# Patient Record
Sex: MALE | Race: WHITE | Employment: OTHER | URBAN - METROPOLITAN AREA
[De-identification: names, ages, dates, MRNs, and addresses within clinical notes are randomized per-mention and may not be internally consistent; named-entity substitution may affect disease eponyms.]

---

## 2017-01-05 ENCOUNTER — OFFICE VISIT (OUTPATIENT)
Dept: INTERNAL MEDICINE CLINIC | Age: 72
End: 2017-01-05

## 2017-01-05 VITALS
OXYGEN SATURATION: 98 % | HEART RATE: 71 BPM | HEIGHT: 74 IN | DIASTOLIC BLOOD PRESSURE: 50 MMHG | TEMPERATURE: 98.3 F | RESPIRATION RATE: 18 BRPM | SYSTOLIC BLOOD PRESSURE: 120 MMHG | WEIGHT: 236 LBS | BODY MASS INDEX: 30.29 KG/M2

## 2017-01-05 DIAGNOSIS — J06.9 ACUTE URI: Primary | ICD-10-CM

## 2017-01-05 RX ORDER — CODEINE PHOSPHATE AND GUAIFENESIN 10; 100 MG/5ML; MG/5ML
5 SOLUTION ORAL
Qty: 120 ML | Refills: 0 | Status: SHIPPED | OUTPATIENT
Start: 2017-01-05 | End: 2017-03-29 | Stop reason: ALTCHOICE

## 2017-01-05 RX ORDER — FLUTICASONE PROPIONATE 50 MCG
SPRAY, SUSPENSION (ML) NASAL
Qty: 1 BOTTLE | Refills: 1 | Status: SHIPPED | OUTPATIENT
Start: 2017-01-05 | End: 2017-09-08 | Stop reason: ALTCHOICE

## 2017-01-05 RX ORDER — AZITHROMYCIN 250 MG/1
TABLET, FILM COATED ORAL
Qty: 6 TAB | Refills: 0 | Status: SHIPPED | OUTPATIENT
Start: 2017-01-05 | End: 2017-03-29 | Stop reason: ALTCHOICE
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
## 2017-01-05 NOTE — PROGRESS NOTES
Patient presents for   Chief Complaint   Patient presents with    Nasal Congestion     since 12/15/2016     Fall risk assessment was not indicated. Depression screening was not indicated Follow up questions were not indicated. 1. Have you been to the ER, urgent care clinic since your last visit? Hospitalized since your last visit? No    2. Have you seen or consulted any other health care providers outside of the [facility] [place] since your last visit? Include any pap smears or colon screening.  No

## 2017-02-03 RX ORDER — SITAGLIPTIN 100 MG/1
TABLET, FILM COATED ORAL
Qty: 90 TAB | Refills: 4 | Status: SHIPPED | OUTPATIENT
Start: 2017-02-03 | End: 2018-03-01 | Stop reason: SDUPTHER

## 2017-02-03 RX ORDER — SERTRALINE HYDROCHLORIDE 100 MG/1
TABLET, FILM COATED ORAL
Qty: 90 TAB | Refills: 4 | Status: SHIPPED | OUTPATIENT
Start: 2017-02-03 | End: 2018-03-11 | Stop reason: SDUPTHER

## 2017-03-01 RX ORDER — ATORVASTATIN CALCIUM 10 MG/1
TABLET, FILM COATED ORAL
Qty: 45 TAB | Refills: 0 | Status: SHIPPED | OUTPATIENT
Start: 2017-03-01 | End: 2017-05-28 | Stop reason: SDUPTHER

## 2017-03-26 RX ORDER — PIOGLITAZONE HCL AND METFORMIN HCL 850; 15 MG/1; MG/1
TABLET ORAL
Qty: 180 TAB | Refills: 0 | Status: SHIPPED | OUTPATIENT
Start: 2017-03-26 | End: 2017-06-22 | Stop reason: SDUPTHER

## 2017-03-27 ENCOUNTER — LAB ONLY (OUTPATIENT)
Dept: INTERNAL MEDICINE CLINIC | Age: 72
End: 2017-03-27

## 2017-03-27 ENCOUNTER — HOSPITAL ENCOUNTER (OUTPATIENT)
Dept: LAB | Age: 72
Discharge: HOME | End: 2017-03-27
Payer: MEDICARE

## 2017-03-27 DIAGNOSIS — E78.2 MIXED HYPERLIPIDEMIA: ICD-10-CM

## 2017-03-27 DIAGNOSIS — I10 ESSENTIAL HYPERTENSION, BENIGN: ICD-10-CM

## 2017-03-27 DIAGNOSIS — E11.9 CONTROLLED TYPE 2 DIABETES MELLITUS WITHOUT COMPLICATION, WITHOUT LONG-TERM CURRENT USE OF INSULIN (HCC): Primary | ICD-10-CM

## 2017-03-27 DIAGNOSIS — E11.9 CONTROLLED TYPE 2 DIABETES MELLITUS WITHOUT COMPLICATION, WITHOUT LONG-TERM CURRENT USE OF INSULIN (HCC): ICD-10-CM

## 2017-03-27 LAB
ALBUMIN SERPL BCP-MCNC: 3.7 G/DL (ref 3.4–5)
ALBUMIN/GLOB SERPL: 1.2 {RATIO} (ref 0.8–1.7)
ALP SERPL-CCNC: 72 U/L (ref 45–117)
ALT SERPL-CCNC: 25 U/L (ref 16–61)
ANION GAP BLD CALC-SCNC: 6 MMOL/L (ref 3–18)
APPEARANCE UR: CLEAR
AST SERPL W P-5'-P-CCNC: 19 U/L (ref 15–37)
BACTERIA URNS QL MICRO: NEGATIVE /HPF
BILIRUB SERPL-MCNC: 0.4 MG/DL (ref 0.2–1)
BILIRUB UR QL: NEGATIVE
BUN SERPL-MCNC: 17 MG/DL (ref 7–18)
BUN/CREAT SERPL: 19 (ref 12–20)
CALCIUM SERPL-MCNC: 9 MG/DL (ref 8.5–10.1)
CHLORIDE SERPL-SCNC: 104 MMOL/L (ref 100–108)
CO2 SERPL-SCNC: 28 MMOL/L (ref 21–32)
COLOR UR: YELLOW
CREAT SERPL-MCNC: 0.89 MG/DL (ref 0.6–1.3)
EPITH CASTS URNS QL MICRO: NORMAL /LPF (ref 0–5)
GLOBULIN SER CALC-MCNC: 3 G/DL (ref 2–4)
GLUCOSE SERPL-MCNC: 154 MG/DL (ref 74–99)
GLUCOSE UR STRIP.AUTO-MCNC: NEGATIVE MG/DL
HGB UR QL STRIP: NEGATIVE
KETONES UR QL STRIP.AUTO: NEGATIVE MG/DL
LEUKOCYTE ESTERASE UR QL STRIP.AUTO: NEGATIVE
NITRITE UR QL STRIP.AUTO: NEGATIVE
PH UR STRIP: 5.5 [PH] (ref 5–8)
POTASSIUM SERPL-SCNC: 4.6 MMOL/L (ref 3.5–5.5)
PROT SERPL-MCNC: 6.7 G/DL (ref 6.4–8.2)
PROT UR STRIP-MCNC: NEGATIVE MG/DL
RBC #/AREA URNS HPF: 0 /HPF (ref 0–5)
SODIUM SERPL-SCNC: 138 MMOL/L (ref 136–145)
SP GR UR REFRACTOMETRY: 1.02 (ref 1–1.03)
UROBILINOGEN UR QL STRIP.AUTO: 0.2 EU/DL (ref 0.2–1)
WBC URNS QL MICRO: NORMAL /HPF (ref 0–4)

## 2017-03-27 PROCEDURE — 80053 COMPREHEN METABOLIC PANEL: CPT | Performed by: INTERNAL MEDICINE

## 2017-03-27 PROCEDURE — 81001 URINALYSIS AUTO W/SCOPE: CPT | Performed by: INTERNAL MEDICINE

## 2017-03-29 ENCOUNTER — HOSPITAL ENCOUNTER (OUTPATIENT)
Dept: GENERAL RADIOLOGY | Age: 72
Discharge: HOME | End: 2017-03-29
Payer: MEDICARE

## 2017-03-29 ENCOUNTER — OFFICE VISIT (OUTPATIENT)
Dept: INTERNAL MEDICINE CLINIC | Age: 72
End: 2017-03-29

## 2017-03-29 VITALS
RESPIRATION RATE: 16 BRPM | WEIGHT: 242 LBS | TEMPERATURE: 97.3 F | HEIGHT: 74 IN | BODY MASS INDEX: 31.06 KG/M2 | DIASTOLIC BLOOD PRESSURE: 60 MMHG | HEART RATE: 68 BPM | SYSTOLIC BLOOD PRESSURE: 124 MMHG | OXYGEN SATURATION: 97 %

## 2017-03-29 DIAGNOSIS — E78.2 MIXED HYPERLIPIDEMIA: ICD-10-CM

## 2017-03-29 DIAGNOSIS — I10 ESSENTIAL HYPERTENSION, BENIGN: ICD-10-CM

## 2017-03-29 DIAGNOSIS — E11.9 CONTROLLED TYPE 2 DIABETES MELLITUS WITHOUT COMPLICATION, WITHOUT LONG-TERM CURRENT USE OF INSULIN (HCC): Primary | ICD-10-CM

## 2017-03-29 PROCEDURE — 71020 XR CHEST PA LAT: CPT

## 2017-03-29 NOTE — MR AVS SNAPSHOT
Visit Information Date & Time Provider Department Dept. Phone Encounter #  
 3/29/2017 11:15 AM Dana Vasquez MD [facility] [place] [phone] [id number] Your Appointments 9/21/2017 12:00 PM  
Follow Up with Dana Vasquez MD  
[address] [facility]) Appt Note: 6mo  
 [address], [address] [place] [address]  
  
   
 [address], [address] [place] [address] Upcoming Health Maintenance Date Due Hepatitis C Screening 1945 DTaP/Tdap/Td series (1 - Tdap) 7/9/1967 EYE EXAM RETINAL OR DILATED Q1 1/29/2016 GLAUCOMA SCREENING Q2Y 1/29/2017 HEMOGLOBIN A1C Q6M 4/20/2017 MICROALBUMIN Q1 10/20/2017 LIPID PANEL Q1 10/20/2017 MEDICARE YEARLY EXAM 10/21/2017 FOOT EXAM Q1 10/23/2017 COLONOSCOPY 11/10/2020 Allergies as of 3/29/2017  Review Complete On: 3/29/2017 By: Alfonzo Gil LPN No Known Allergies Current Immunizations  Reviewed on 10/20/2016 Name Date Influenza High Dose Vaccine PF 10/20/2016 Influenza Vaccine Stephanie Khurram) 10/5/2015 Pneumococcal Conjugate (PCV-13) 10/5/2015 Pneumococcal Polysaccharide (PPSV-23) 6/14/2012, 6/8/2009 Not reviewed this visit You Were Diagnosed With   
  
 Codes Comments Essential hypertension, benign    -  Primary ICD-10-CM: I10 
ICD-9-CM: 401.1 Vitals BP Pulse Temp Resp Height(growth percentile) 124/60 (BP 1 Location: Left arm, BP Patient Position: Sitting) 68 97.3 °F (36.3 °C) (Tympanic) 16 6' 1.5\" (1.867 m) Weight(growth percentile) SpO2 BMI Smoking Status 242 lb (109.8 kg) 97% 31.5 kg/m2 Never Smoker Vitals History BMI and BSA Data Body Mass Index Body Surface Area 31.5 kg/m 2 2.39 m 2 Preferred Pharmacy Pharmacy Name Phone [id number] [address] [phone] Your Updated Medication List  
  
 This list is accurate as of: 3/29/17  1:21 PM.  Always use your most recent med list.  
  
  
  
  
 atorvastatin 10 mg tablet Commonly known as:  LIPITOR  
TAKE 1/2 TABLET BY MOUTH EVERY DAY  
  
 fluticasone 50 mcg/actuation nasal spray Commonly known as:  FLONASE  
2 spray each nostril daily  
  
 gabapentin 100 mg capsule Commonly known as:  NEURONTIN  
  
 glimepiride 4 mg tablet Commonly known as:  AMARYL  
TAKE 1 AND 1/2 TABLET BY MOUTH IN THE MORNING ABD 1/2 TABLET EVERY MORNING  
  
 JANUVIA 100 mg tablet Generic drug:  SITagliptin TAKE 1 TABLET BY MOUTH DAILY pioglitazone-metFORMIN  mg per tablet Commonly known as:  ACTOPLUS MET  
TAKE 1 TABLET BY MOUTH TWICE A DAY  
  
 ramipril 10 mg capsule Commonly known as:  ALTACE  
TAKE 1 CAPSULE BY MOUTH DAILY. sertraline 100 mg tablet Commonly known as:  ZOLOFT  
TAKE 1 TABLET BY MOUTH DAILY  
  
 tadalafil 5 mg tablet Commonly known as:  CIALIS  
1 tablet daily WELCHOL 625 mg tablet Generic drug:  colesevelam  
TAKE 3 TABLETS BY MOUTH TWICE A DAY WITH MEALS To-Do List   
 03/29/2017 Imaging:  XR CHEST PA LAT Patient Instructions Health Maintenance Due Topic Date Due  
[place] Dose Test  1945  
 DTaP/Tdap/Td  (1 - Tdap) 07/09/1967  Eye Exam  01/29/2016  Glaucoma Screening   01/29/2017  Hemoglobin A1C    04/20/2017 Introducing [facility]! Ledy Venegas introduces [employer]ots patient portal. Now you can access parts of your medical record, email your doctor's office, and request medication refills online. 1. In your internet browser, go to [email or web address] 2. Click on the First Time User? Click Here link in the Sign In box. You will see the New Member Sign Up page. 3. Enter your Y[employer]ots Access Code exactly as it appears below. You will not need to use this code after youve completed the sign-up process.  If you do not sign up before the expiration date, you must request a new code. · Panjo Access Code: [email or web address] Expires: 6/27/2017  1:21 PM 
 
4. Enter the last four digits of your Social Security Number (xxxx) and Date of Birth (mm/dd/yyyy) as indicated and click Submit. You will be taken to the next sign-up page. 5. Create a Panjo ID. This will be your Panjo login ID and cannot be changed, so think of one that is secure and easy to remember. 6. Create a Panjo password. You can change your password at any time. 7. Enter your Password Reset Question and Answer. This can be used at a later time if you forget your password. 8. Enter your e-mail address. You will receive e-mail notification when new information is available in [address] 9. Click Sign Up. You can now view and download portions of your medical record. 10. Click the Download Summary menu link to download a portable copy of your medical information. If you have questions, please visit the Frequently Asked Questions section of the Panjo website. Remember, Panjo is NOT to be used for urgent needs. For medical emergencies, dial 911. Now available from your iPhone and Android! Please provide this summary of care documentation to your next provider. Your primary care clinician is listed as Xiao Mckay. If you have any questions after today's visit, please call [phone].

## 2017-03-29 NOTE — PATIENT INSTRUCTIONS
Health Maintenance Due   Topic Date Due    Hepatitis C Test  1945    DTaP/Tdap/Td  (1 - Tdap) 07/09/1967    Eye Exam  01/29/2016    Glaucoma Screening   01/29/2017    Hemoglobin A1C    04/20/2017

## 2017-03-29 NOTE — PROGRESS NOTES
1. Have you been to the ER, urgent care clinic since your last visit? Hospitalized since your last visit? No    2. Have you seen or consulted any other health care providers outside of the [address] [place] [address] since your last visit? Include any pap smears or colon screening.  No
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
## 2017-04-03 ENCOUNTER — HOSPITAL ENCOUNTER (OUTPATIENT)
Dept: CT IMAGING | Age: 72
Discharge: HOME | End: 2017-04-03
Attending: INTERNAL MEDICINE
Payer: MEDICARE

## 2017-04-03 DIAGNOSIS — R93.89 ABNORMAL CHEST X-RAY: Primary | ICD-10-CM

## 2017-04-03 DIAGNOSIS — R93.89 ABNORMAL CHEST X-RAY: ICD-10-CM

## 2017-04-03 PROCEDURE — 71260 CT THORAX DX C+: CPT

## 2017-04-03 PROCEDURE — 74011636320 HC RX REV CODE- 636/320: Performed by: INTERNAL MEDICINE

## 2017-04-03 RX ADMIN — IOPAMIDOL 80 ML: 612 INJECTION, SOLUTION INTRAVENOUS at 13:00

## 2017-04-05 ENCOUNTER — TELEPHONE (OUTPATIENT)
Dept: INTERNAL MEDICINE CLINIC | Age: 72
End: 2017-04-05

## 2017-04-05 NOTE — TELEPHONE ENCOUNTER
Patient returning someone from heres call #[phone] He stated it could be about a test he had yesterday.

## 2017-04-05 NOTE — TELEPHONE ENCOUNTER
Called and informed patient of normal ct results. Added that Dr Sameera Smith will be having another MD look at the scans to make sure they are clear and if anything changes patient will be informed. Understanding was voiced.

## 2017-05-04 RX ORDER — RAMIPRIL 10 MG/1
CAPSULE ORAL
Qty: 90 CAP | Refills: 1 | Status: SHIPPED | OUTPATIENT
Start: 2017-05-04 | End: 2017-11-01 | Stop reason: SDUPTHER

## 2017-05-28 RX ORDER — COLESEVELAM HYDROCHLORIDE 625 MG/1
TABLET, FILM COATED ORAL
Qty: 540 TAB | Refills: 1 | Status: SHIPPED | OUTPATIENT
Start: 2017-05-28 | End: 2017-12-14 | Stop reason: SDUPTHER

## 2017-05-28 RX ORDER — ATORVASTATIN CALCIUM 10 MG/1
TABLET, FILM COATED ORAL
Qty: 45 TAB | Refills: 0 | Status: SHIPPED | OUTPATIENT
Start: 2017-05-28 | End: 2017-09-02 | Stop reason: SDUPTHER

## 2017-06-22 RX ORDER — PIOGLITAZONE HCL AND METFORMIN HCL 850; 15 MG/1; MG/1
TABLET ORAL
Qty: 180 TAB | Refills: 0 | Status: SHIPPED | OUTPATIENT
Start: 2017-06-22 | End: 2017-09-29 | Stop reason: SDUPTHER

## 2017-08-06 RX ORDER — GABAPENTIN 100 MG/1
CAPSULE ORAL
Qty: 360 CAP | Refills: 3 | Status: SHIPPED | OUTPATIENT
Start: 2017-08-06 | End: 2018-10-03 | Stop reason: SDUPTHER

## 2017-08-16 ENCOUNTER — TELEPHONE (OUTPATIENT)
Dept: INTERNAL MEDICINE CLINIC | Age: 72
End: 2017-08-16

## 2017-08-16 DIAGNOSIS — R20.2 PARESTHESIA: Primary | ICD-10-CM

## 2017-08-16 NOTE — TELEPHONE ENCOUNTER
Patient's daughter is requesting referral to Dr. Odilia Dyer for tingling on left side. He had stroke about 5 years ago. We will schedule the appointment for patient. no

## 2017-08-29 ENCOUNTER — OFFICE VISIT (OUTPATIENT)
Dept: NEUROLOGY | Age: 72
End: 2017-08-29

## 2017-08-29 VITALS
TEMPERATURE: 97.4 F | HEIGHT: 74 IN | SYSTOLIC BLOOD PRESSURE: 138 MMHG | HEART RATE: 63 BPM | BODY MASS INDEX: 30.16 KG/M2 | WEIGHT: 235 LBS | DIASTOLIC BLOOD PRESSURE: 62 MMHG | RESPIRATION RATE: 12 BRPM | OXYGEN SATURATION: 95 %

## 2017-08-29 DIAGNOSIS — E11.9 CONTROLLED TYPE 2 DIABETES MELLITUS WITHOUT COMPLICATION, WITH LONG-TERM CURRENT USE OF INSULIN (HCC): ICD-10-CM

## 2017-08-29 DIAGNOSIS — Z79.4 CONTROLLED TYPE 2 DIABETES MELLITUS WITHOUT COMPLICATION, WITH LONG-TERM CURRENT USE OF INSULIN (HCC): ICD-10-CM

## 2017-08-29 DIAGNOSIS — I61.0 NONTRAUMATIC SUBCORTICAL HEMORRHAGE OF RIGHT CEREBRAL HEMISPHERE (HCC): Primary | ICD-10-CM
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
## 2017-08-29 NOTE — MR AVS SNAPSHOT
Visit Information Date & Time Provider Department Dept. Phone Encounter #  
 8/29/2017  8:00 AM Magdy Tapia MD [address] at [address] [facility] [id number] Follow-up Instructions Return in about 4 weeks (around 9/26/2017). Follow-up and Disposition History Your Appointments 9/21/2017 12:00 PM  
Follow Up with Brennen Mark MD  
55 [facility]) Appt Note: 6mo  
 [address]  35 [facility]  
[phone]  
  
   
 [address] [facility] [address]  
  
    
 10/3/2017  3:40 PM  
Follow Up with Magdy Tapia MD  
[address] at [address] [facility]) Appt Note: 4 week fu  MRI  
 [address] [address] [address] [facility] 630 [facility] [address] 200 [facility] Upcoming Health Maintenance Date Due Hepatitis C Screening 1945 DTaP/Tdap/Td series (1 - Tdap) 7/9/1967 HEMOGLOBIN A1C Q6M 4/20/2017 INFLUENZA AGE 9 TO ADULT 8/1/2017 MICROALBUMIN Q1 10/20/2017 LIPID PANEL Q1 10/20/2017 MEDICARE YEARLY EXAM 10/21/2017 EYE EXAM RETINAL OR DILATED Q1 3/30/2018 FOOT EXAM Q1 7/24/2018 GLAUCOMA SCREENING Q2Y 3/30/2019 COLONOSCOPY 11/10/2020 Allergies as of 8/29/2017  Review Complete On: 8/29/2017 By: Sen Blue LPN No Known Allergies Current Immunizations  Reviewed on 10/20/2016 Name Date Influenza High Dose Vaccine PF 10/20/2016 Influenza Vaccine Remus Ligas) 10/5/2015 Pneumococcal Conjugate (PCV-13) 10/5/2015 Pneumococcal Polysaccharide (PPSV-23) 6/14/2012, 6/8/2009 Not reviewed this visit You Were Diagnosed With   
  
 Codes Comments Nontraumatic subcortical hemorrhage of right cerebral hemisphere [facility])    -  Primary ICD-10-CM: I61.0 ICD-9-CM: 752  Controlled type 2 diabetes mellitus without complication, with long-term current use of insulin (HCC)     ICD-10-CM: E11.9, Z79.4 ICD-9-CM: 250.00, V58.67 Vitals BP Pulse Temp Resp Height(growth percentile) Weight(growth percentile)  
 138/62 (BP 1 Location: Left arm, BP Patient Position: Sitting) 63 97.4 °F (36.3 °C) (Oral) 12 6' 1.5\" (1.867 m) 235 lb (106.6 kg) SpO2 BMI Smoking Status 95% 30.58 kg/m2 Never Smoker Vitals History BMI and BSA Data Body Mass Index Body Surface Area 30.58 kg/m 2 2.35 m 2 Preferred Pharmacy Pharmacy Name Phone [id number]- [address] [phone] Your Updated Medication List  
  
   
This list is accurate as of: 8/29/17  9:11 AM.  Always use your most recent med list.  
  
  
  
  
 atorvastatin 10 mg tablet Commonly known as:  LIPITOR  
TAKE 1/2 TABLET BY MOUTH EVERY DAY  
  
 fluticasone 50 mcg/actuation nasal spray Commonly known as:  FLONASE  
2 spray each nostril daily  
  
 gabapentin 100 mg capsule Commonly known as:  NEURONTIN  
TAKE 1 CAPSULE BY MOUTH IN THE AM, 1 CAPSULE IN THE MID DAY, 2 CAPSULES AT BEDTIME  
  
 glimepiride 4 mg tablet Commonly known as:  AMARYL  
TAKE 1 AND 1/2 TABLET BY MOUTH IN THE MORNING ABD 1/2 TABLET EVERY MORNING  
  
 JANUVIA 100 mg tablet Generic drug:  SITagliptin TAKE 1 TABLET BY MOUTH DAILY pioglitazone-metFORMIN  mg per tablet Commonly known as:  ACTOPLUS MET  
TAKE 1 TABLET BY MOUTH TWICE A DAY  
  
 ramipril 10 mg capsule Commonly known as:  ALTACE  
TAKE 1 CAPSULE BY MOUTH DAILY. sertraline 100 mg tablet Commonly known as:  ZOLOFT  
TAKE 1 TABLET BY MOUTH DAILY WELCHOL 625 mg tablet Generic drug:  colesevelam  
TAKE 3 TABLETS BY MOUTH TWICE A DAY WITH MEALS Follow-up Instructions Return in about 4 weeks (around 9/26/2017). To-Do List   
 08/29/2017 Imaging:  MRI BRAIN WO CONT Introducing [facility]! aJckson Portillo introduces [employer] patient portal. Now you can access parts of your medical record, email your doctor's office, and request medication refills online. 1. In your internet browser, go to [email or web address] 2. Click on the First Time User? Click Here link in the Sign In box. You will see the New Member Sign Up page. 3. Enter your [employer] Access Code exactly as it appears below. You will not need to use this code after youve completed the sign-up process. If you do not sign up before the expiration date, you must request a new code. · [employer] Access Code: [email or web address] Expires: 11/27/2017  7:44 AM 
 
4. Enter the last four digits of your Social Security Number (xxxx) and Date of Birth (mm/dd/yyyy) as indicated and click Submit. You will be taken to the next sign-up page. 5. Create a [employer] ID. This will be your [employer] login ID and cannot be changed, so think of one that is secure and easy to remember. 6. Create a [employer] password. You can change your password at any time. 7. Enter your Password Reset Question and Answer. This can be used at a later time if you forget your password. 8. Enter your e-mail address. You will receive e-mail notification when new information is available in [address] 9. Click Sign Up. You can now view and download portions of your medical record. 10. Click the Download Summary menu link to download a portable copy of your medical information. If you have questions, please visit the Frequently Asked Questions section of the [employer] website. Remember, [employer] is NOT to be used for urgent needs. For medical emergencies, dial 911. Now available from your iPhone and Android! Please provide this summary of care documentation to your next provider. Your primary care clinician is listed as Julianne Sykes. If you have any questions after today's visit, please call [phone].
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
## 2017-09-02 RX ORDER — ATORVASTATIN CALCIUM 10 MG/1
TABLET, FILM COATED ORAL
Qty: 45 TAB | Refills: 0 | Status: SHIPPED | OUTPATIENT
Start: 2017-09-02 | End: 2017-12-01 | Stop reason: SDUPTHER

## 2017-09-05 ENCOUNTER — HOSPITAL ENCOUNTER (OUTPATIENT)
Age: 72
Discharge: HOME | End: 2017-09-05
Attending: PSYCHIATRY & NEUROLOGY
Payer: MEDICARE

## 2017-09-05 ENCOUNTER — OFFICE VISIT (OUTPATIENT)
Dept: INTERNAL MEDICINE CLINIC | Age: 72
End: 2017-09-05

## 2017-09-05 VITALS
SYSTOLIC BLOOD PRESSURE: 120 MMHG | DIASTOLIC BLOOD PRESSURE: 60 MMHG | TEMPERATURE: 97.4 F | OXYGEN SATURATION: 99 % | RESPIRATION RATE: 16 BRPM | HEART RATE: 64 BPM | WEIGHT: 235 LBS | HEIGHT: 74 IN | BODY MASS INDEX: 30.16 KG/M2

## 2017-09-05 DIAGNOSIS — I61.0 NONTRAUMATIC SUBCORTICAL HEMORRHAGE OF RIGHT CEREBRAL HEMISPHERE (HCC): ICD-10-CM

## 2017-09-05 DIAGNOSIS — Z23 ENCOUNTER FOR IMMUNIZATION: ICD-10-CM

## 2017-09-05 LAB — GLUCOSE POC: 181 MG/DL

## 2017-09-05 PROCEDURE — 70551 MRI BRAIN STEM W/O DYE: CPT

## 2017-09-05 NOTE — PROGRESS NOTES
1. Have you been to the ER, urgent care clinic since your last visit? Hospitalized since your last visit? No    2. Have you seen or consulted any other health care providers outside of the [address] [place] [address] since your last visit? Include any pap smears or colon screening.  No
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
## 2017-09-21 ENCOUNTER — LAB ONLY (OUTPATIENT)
Dept: INTERNAL MEDICINE CLINIC | Age: 72
End: 2017-09-21

## 2017-09-21 ENCOUNTER — OFFICE VISIT (OUTPATIENT)
Dept: INTERNAL MEDICINE CLINIC | Age: 72
End: 2017-09-21

## 2017-09-21 ENCOUNTER — HOSPITAL ENCOUNTER (OUTPATIENT)
Dept: LAB | Age: 72
Discharge: HOME | End: 2017-09-21
Payer: MEDICARE

## 2017-09-21 VITALS
HEIGHT: 74 IN | BODY MASS INDEX: 29.26 KG/M2 | SYSTOLIC BLOOD PRESSURE: 118 MMHG | TEMPERATURE: 97.7 F | OXYGEN SATURATION: 98 % | WEIGHT: 228 LBS | RESPIRATION RATE: 16 BRPM | DIASTOLIC BLOOD PRESSURE: 58 MMHG | HEART RATE: 62 BPM

## 2017-09-21 DIAGNOSIS — E78.2 MIXED HYPERLIPIDEMIA: ICD-10-CM

## 2017-09-21 DIAGNOSIS — I10 ESSENTIAL HYPERTENSION, BENIGN: ICD-10-CM

## 2017-09-21 DIAGNOSIS — E11.9 CONTROLLED TYPE 2 DIABETES MELLITUS WITHOUT COMPLICATION, WITH LONG-TERM CURRENT USE OF INSULIN (HCC): Primary | ICD-10-CM

## 2017-09-21 DIAGNOSIS — Z79.4 CONTROLLED TYPE 2 DIABETES MELLITUS WITHOUT COMPLICATION, WITH LONG-TERM CURRENT USE OF INSULIN (HCC): Primary | ICD-10-CM

## 2017-09-21 DIAGNOSIS — Z79.4 CONTROLLED TYPE 2 DIABETES MELLITUS WITHOUT COMPLICATION, WITH LONG-TERM CURRENT USE OF INSULIN (HCC): ICD-10-CM

## 2017-09-21 DIAGNOSIS — E11.9 CONTROLLED TYPE 2 DIABETES MELLITUS WITHOUT COMPLICATION, WITH LONG-TERM CURRENT USE OF INSULIN (HCC): ICD-10-CM

## 2017-09-21 LAB
ALBUMIN SERPL-MCNC: 3.9 G/DL (ref 3.4–5)
ALBUMIN/GLOB SERPL: 1.3 {RATIO} (ref 0.8–1.7)
ALP SERPL-CCNC: 59 U/L (ref 45–117)
ALT SERPL-CCNC: 31 U/L (ref 16–61)
ANION GAP SERPL CALC-SCNC: 7 MMOL/L (ref 3–18)
AST SERPL-CCNC: 26 U/L (ref 15–37)
BASOPHILS # BLD: 0 K/UL (ref 0–0.06)
BASOPHILS NFR BLD: 1 % (ref 0–2)
BILIRUB SERPL-MCNC: 0.5 MG/DL (ref 0.2–1)
BUN SERPL-MCNC: 17 MG/DL (ref 7–18)
BUN/CREAT SERPL: 19 (ref 12–20)
CALCIUM SERPL-MCNC: 8.6 MG/DL (ref 8.5–10.1)
CHLORIDE SERPL-SCNC: 107 MMOL/L (ref 100–108)
CO2 SERPL-SCNC: 27 MMOL/L (ref 21–32)
CREAT SERPL-MCNC: 0.91 MG/DL (ref 0.6–1.3)
DIFFERENTIAL METHOD BLD: ABNORMAL
EOSINOPHIL # BLD: 0.2 K/UL (ref 0–0.4)
EOSINOPHIL NFR BLD: 2 % (ref 0–5)
ERYTHROCYTE [DISTWIDTH] IN BLOOD BY AUTOMATED COUNT: 14.5 % (ref 11.6–14.5)
EST. AVERAGE GLUCOSE BLD GHB EST-MCNC: 180 MG/DL
GLOBULIN SER CALC-MCNC: 3 G/DL (ref 2–4)
GLUCOSE POC: 283 MG/DL
GLUCOSE SERPL-MCNC: 90 MG/DL (ref 74–99)
HBA1C MFR BLD: 7.9 % (ref 4.2–5.6)
HCT VFR BLD AUTO: 41.2 % (ref 36–48)
HGB BLD-MCNC: 13 G/DL (ref 13–16)
LYMPHOCYTES # BLD: 1.8 K/UL (ref 0.9–3.6)
LYMPHOCYTES NFR BLD: 21 % (ref 21–52)
MCH RBC QN AUTO: 29.5 PG (ref 24–34)
MCHC RBC AUTO-ENTMCNC: 31.6 G/DL (ref 31–37)
MCV RBC AUTO: 93.6 FL (ref 74–97)
MONOCYTES # BLD: 0.7 K/UL (ref 0.05–1.2)
MONOCYTES NFR BLD: 8 % (ref 3–10)
NEUTS SEG # BLD: 5.7 K/UL (ref 1.8–8)
NEUTS SEG NFR BLD: 68 % (ref 40–73)
PLATELET # BLD AUTO: 221 K/UL (ref 135–420)
PMV BLD AUTO: 9.7 FL (ref 9.2–11.8)
POTASSIUM SERPL-SCNC: 4.5 MMOL/L (ref 3.5–5.5)
PROT SERPL-MCNC: 6.9 G/DL (ref 6.4–8.2)
RBC # BLD AUTO: 4.4 M/UL (ref 4.7–5.5)
SODIUM SERPL-SCNC: 141 MMOL/L (ref 136–145)
WBC # BLD AUTO: 8.3 K/UL (ref 4.6–13.2)

## 2017-09-21 PROCEDURE — 36415 COLL VENOUS BLD VENIPUNCTURE: CPT | Performed by: INTERNAL MEDICINE

## 2017-09-21 PROCEDURE — 80053 COMPREHEN METABOLIC PANEL: CPT | Performed by: INTERNAL MEDICINE

## 2017-09-21 PROCEDURE — 83036 HEMOGLOBIN GLYCOSYLATED A1C: CPT | Performed by: INTERNAL MEDICINE

## 2017-09-21 PROCEDURE — 85025 COMPLETE CBC W/AUTO DIFF WBC: CPT | Performed by: INTERNAL MEDICINE

## 2017-09-27 ENCOUNTER — HOSPITAL ENCOUNTER (OUTPATIENT)
Dept: LAB | Age: 72
Discharge: HOME | End: 2017-09-27
Payer: MEDICARE

## 2017-09-27 DIAGNOSIS — E11.9 CONTROLLED TYPE 2 DIABETES MELLITUS WITHOUT COMPLICATION, WITH LONG-TERM CURRENT USE OF INSULIN (HCC): ICD-10-CM

## 2017-09-27 DIAGNOSIS — I10 ESSENTIAL HYPERTENSION, BENIGN: ICD-10-CM

## 2017-09-27 DIAGNOSIS — E78.2 MIXED HYPERLIPIDEMIA: ICD-10-CM

## 2017-09-27 DIAGNOSIS — Z79.4 CONTROLLED TYPE 2 DIABETES MELLITUS WITHOUT COMPLICATION, WITH LONG-TERM CURRENT USE OF INSULIN (HCC): ICD-10-CM

## 2017-09-27 LAB
APPEARANCE UR: CLEAR
BACTERIA URNS QL MICRO: ABNORMAL /HPF
BILIRUB UR QL: NEGATIVE
COLOR UR: ABNORMAL
EPITH CASTS URNS QL MICRO: NEGATIVE /LPF (ref 0–5)
GLUCOSE UR STRIP.AUTO-MCNC: >1000 MG/DL
HGB UR QL STRIP: NEGATIVE
KETONES UR QL STRIP.AUTO: NEGATIVE MG/DL
LEUKOCYTE ESTERASE UR QL STRIP.AUTO: NEGATIVE
NITRITE UR QL STRIP.AUTO: NEGATIVE
PH UR STRIP: 5 [PH] (ref 5–8)
PROT UR STRIP-MCNC: NEGATIVE MG/DL
RBC #/AREA URNS HPF: NEGATIVE /HPF (ref 0–5)
SP GR UR REFRACTOMETRY: 1.02 (ref 1–1.03)
UROBILINOGEN UR QL STRIP.AUTO: 0.2 EU/DL (ref 0.2–1)
WBC URNS QL MICRO: ABNORMAL /HPF (ref 0–4)
YEAST URNS QL MICRO: ABNORMAL

## 2017-09-27 PROCEDURE — 82043 UR ALBUMIN QUANTITATIVE: CPT | Performed by: INTERNAL MEDICINE

## 2017-09-27 PROCEDURE — 81001 URINALYSIS AUTO W/SCOPE: CPT | Performed by: INTERNAL MEDICINE

## 2017-09-28 LAB
CREAT UR-MCNC: 77.41 MG/DL (ref 30–125)
MICROALBUMIN UR-MCNC: 0.4 MG/DL (ref 0–3)
MICROALBUMIN/CREAT UR-RTO: 5 MG/G (ref 0–30)

## 2017-09-29 RX ORDER — PIOGLITAZONE HCL AND METFORMIN HCL 850; 15 MG/1; MG/1
TABLET ORAL
Qty: 180 TAB | Refills: 2 | Status: SHIPPED | OUTPATIENT
Start: 2017-09-29 | End: 2018-06-13 | Stop reason: ALTCHOICE

## 2017-10-03 ENCOUNTER — OFFICE VISIT (OUTPATIENT)
Dept: NEUROLOGY | Age: 72
End: 2017-10-03

## 2017-10-03 VITALS
RESPIRATION RATE: 12 BRPM | WEIGHT: 227 LBS | DIASTOLIC BLOOD PRESSURE: 60 MMHG | TEMPERATURE: 98.2 F | OXYGEN SATURATION: 97 % | SYSTOLIC BLOOD PRESSURE: 128 MMHG | BODY MASS INDEX: 29.13 KG/M2 | HEIGHT: 74 IN | HEART RATE: 70 BPM

## 2017-10-03 DIAGNOSIS — E11.9 CONTROLLED TYPE 2 DIABETES MELLITUS WITHOUT COMPLICATION, WITH LONG-TERM CURRENT USE OF INSULIN (HCC): ICD-10-CM

## 2017-10-03 DIAGNOSIS — I61.0 NONTRAUMATIC SUBCORTICAL HEMORRHAGE OF RIGHT CEREBRAL HEMISPHERE (HCC): Primary | ICD-10-CM

## 2017-10-03 DIAGNOSIS — Z79.4 CONTROLLED TYPE 2 DIABETES MELLITUS WITHOUT COMPLICATION, WITH LONG-TERM CURRENT USE OF INSULIN (HCC): ICD-10-CM
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
## 2017-10-09 DIAGNOSIS — Z23 ENCOUNTER FOR IMMUNIZATION: ICD-10-CM

## 2017-10-09 NOTE — TELEPHONE ENCOUNTER
Requested Prescriptions     Pending Prescriptions Disp Refills    empagliflozin (JARDIANCE) 10 mg tablet 30 Tab 2     Si tablet each AM

## 2017-10-11 ENCOUNTER — CLINICAL SUPPORT (OUTPATIENT)
Dept: INTERNAL MEDICINE CLINIC | Age: 72
End: 2017-10-11

## 2017-10-11 DIAGNOSIS — E11.9 CONTROLLED TYPE 2 DIABETES MELLITUS WITHOUT COMPLICATION, WITH LONG-TERM CURRENT USE OF INSULIN (HCC): Primary | ICD-10-CM

## 2017-10-11 DIAGNOSIS — Z79.4 CONTROLLED TYPE 2 DIABETES MELLITUS WITHOUT COMPLICATION, WITH LONG-TERM CURRENT USE OF INSULIN (HCC): Primary | ICD-10-CM

## 2017-10-11 LAB — GLUCOSE POC: 173 MG/DL

## 2017-10-11 NOTE — PROGRESS NOTES
Patient came into the office for a non-fasting blood sugar check. Blood sugar was checked with patient consent and resulted to be 173. Dr Kory Culp made aware and patient allowed to leave the office with no new orders.

## 2017-10-30 ENCOUNTER — OFFICE VISIT (OUTPATIENT)
Dept: INTERNAL MEDICINE CLINIC | Age: 72
End: 2017-10-30

## 2017-10-30 VITALS
HEART RATE: 61 BPM | RESPIRATION RATE: 16 BRPM | DIASTOLIC BLOOD PRESSURE: 72 MMHG | BODY MASS INDEX: 29.13 KG/M2 | OXYGEN SATURATION: 98 % | TEMPERATURE: 98.4 F | WEIGHT: 227 LBS | HEIGHT: 74 IN | SYSTOLIC BLOOD PRESSURE: 120 MMHG

## 2017-10-30 DIAGNOSIS — I10 ESSENTIAL HYPERTENSION, BENIGN: ICD-10-CM

## 2017-10-30 LAB
BILIRUB UR QL STRIP: NEGATIVE
GLUCOSE POC: 206 MG/DL
GLUCOSE UR-MCNC: NORMAL MG/DL
KETONES P FAST UR STRIP-MCNC: NEGATIVE MG/DL
PH UR STRIP: 7 [PH] (ref 4.6–8)
PROT UR QL STRIP: NEGATIVE MG/DL
SP GR UR STRIP: 1.01 (ref 1–1.03)
UA UROBILINOGEN AMB POC: NORMAL (ref 0.2–1)
URINALYSIS CLARITY POC: CLEAR
URINALYSIS COLOR POC: YELLOW
URINE BLOOD POC: NORMAL
URINE LEUKOCYTES POC: NEGATIVE
URINE NITRITES POC: NEGATIVE

## 2017-10-30 NOTE — PROGRESS NOTES
Patient presents for   Chief Complaint   Patient presents with    High Blood Sugar     Fall risk assessment was not indicated. Depression screening was not indicated Follow up questions were not indicated. 1. Have you been to the ER, urgent care clinic since your last visit? Hospitalized since your last visit? No    2. Have you seen or consulted any other health care providers outside of the [address] [place] [address] since your last visit? Include any pap smears or colon screening.  No
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
## 2017-11-01 ENCOUNTER — TELEPHONE (OUTPATIENT)
Dept: INTERNAL MEDICINE CLINIC | Age: 72
End: 2017-11-01

## 2017-11-01 RX ORDER — RAMIPRIL 10 MG/1
CAPSULE ORAL
Qty: 90 CAP | Refills: 1 | Status: SHIPPED | OUTPATIENT
Start: 2017-11-01 | End: 2018-04-24 | Stop reason: SDUPTHER

## 2017-11-01 NOTE — TELEPHONE ENCOUNTER
Patient's daughter called to see if any adjustments were going to be made to her dad's medication. She says he stays tired all the time and has had irregular blood sugars. Please advise.

## 2017-11-02 DIAGNOSIS — Z86.69 HISTORY OF OBSTRUCTIVE SLEEP APNEA: Primary | ICD-10-CM

## 2017-11-03 NOTE — TELEPHONE ENCOUNTER
Medications confirmed and Per Oneida Hopkins NP he should continue on all medications and to continue with Dr Cherry Tate recommendation to take Half of the glimepiride in the afternoon if his sugars spike. Also sleep study was ordered to address fatigue and snoring.  Ms Van expressed understanding

## 2017-11-17 DIAGNOSIS — R20.2 PARESTHESIA: Primary | ICD-10-CM

## 2017-11-19 RX ORDER — GLIMEPIRIDE 4 MG/1
TABLET ORAL
Qty: 180 TAB | Refills: 3 | Status: SHIPPED | OUTPATIENT
Start: 2017-11-19 | End: 2018-06-13 | Stop reason: ALTCHOICE

## 2017-11-30 ENCOUNTER — OFFICE VISIT (OUTPATIENT)
Dept: INTERNAL MEDICINE CLINIC | Age: 72
End: 2017-11-30

## 2017-11-30 VITALS
HEIGHT: 74 IN | BODY MASS INDEX: 28.75 KG/M2 | OXYGEN SATURATION: 97 % | TEMPERATURE: 98.5 F | DIASTOLIC BLOOD PRESSURE: 64 MMHG | RESPIRATION RATE: 16 BRPM | WEIGHT: 224 LBS | HEART RATE: 64 BPM | SYSTOLIC BLOOD PRESSURE: 110 MMHG

## 2017-11-30 DIAGNOSIS — Z00.00 MEDICARE ANNUAL WELLNESS VISIT, SUBSEQUENT: Primary | ICD-10-CM

## 2017-11-30 DIAGNOSIS — K52.9 GASTROENTERITIS: ICD-10-CM

## 2017-11-30 DIAGNOSIS — I10 ESSENTIAL HYPERTENSION, BENIGN: ICD-10-CM

## 2017-11-30 RX ORDER — AZITHROMYCIN 500 MG/1
500 TABLET, FILM COATED ORAL DAILY
Qty: 3 TAB | Refills: 0 | Status: SHIPPED | OUTPATIENT
Start: 2017-11-30 | End: 2017-12-03

## 2017-11-30 RX ORDER — ONDANSETRON 4 MG/1
4 TABLET, ORALLY DISINTEGRATING ORAL
Qty: 30 TAB | Refills: 0 | Status: SHIPPED | OUTPATIENT
Start: 2017-11-30 | End: 2018-06-13 | Stop reason: ALTCHOICE
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
## 2017-11-30 NOTE — PATIENT INSTRUCTIONS
Body Mass Index: Care Instructions  Your Care Instructions    Body mass index (BMI) can help you see if your weight is raising your risk for health problems. It uses a formula to compare how much you weigh with how tall you are. · A BMI lower than 18.5 is considered underweight. · A BMI between 18.5 and 24.9 is considered healthy. · A BMI between 25 and 29.9 is considered overweight. A BMI of 30 or higher is considered obese. If your BMI is in the normal range, it means that you have a lower risk for weight-related health problems. If your BMI is in the overweight or obese range, you may be at increased risk for weight-related health problems, such as high blood pressure, heart disease, stroke, arthritis or joint pain, and diabetes. If your BMI is in the underweight range, you may be at increased risk for health problems such as fatigue, lower protection (immunity) against illness, muscle loss, bone loss, hair loss, and hormone problems. BMI is just one measure of your risk for weight-related health problems. You may be at higher risk for health problems if you are not active, you eat an unhealthy diet, or you drink too much alcohol or use tobacco products. Follow-up care is a key part of your treatment and safety. Be sure to make and go to all appointments, and call your doctor if you are having problems. It's also a good idea to know your test results and keep a list of the medicines you take. How can you care for yourself at home? · Practice healthy eating habits. This includes eating plenty of fruits, vegetables, whole grains, lean protein, and low-fat dairy. · If your doctor recommends it, get more exercise. Walking is a good choice. Bit by bit, increase the amount you walk every day. Try for at least 30 minutes on most days of the week. · Do not smoke. Smoking can increase your risk for health problems. If you need help quitting, talk to your doctor about stop-smoking programs and medicines. These can increase your chances of quitting for good. · Limit alcohol to 2 drinks a day for men and 1 drink a day for women. Too much alcohol can cause health problems. If you have a BMI higher than 25  · Your doctor may do other tests to check your risk for weight-related health problems. This may include measuring the distance around your waist. A waist measurement of more than 40 inches in men or 35 inches in women can increase the risk of weight-related health problems. · Talk with your doctor about steps you can take to stay healthy or improve your health. You may need to make lifestyle changes to lose weight and stay healthy, such as changing your diet and getting regular exercise. If you have a BMI lower than 18.5  · Your doctor may do other tests to check your risk for health problems. · Talk with your doctor about steps you can take to stay healthy or improve your health. You may need to make lifestyle changes to gain or maintain weight and stay healthy, such as getting more healthy foods in your diet and doing exercises to build muscle. Where can you learn more? Go to http://ofelia-mehul.info/. Enter S176 in the search box to learn more about \"Body Mass Index: Care Instructions. \"  Current as of: October 13, 2016  Content Version: 11.4  © 2642-8377 Healthwise, Incorporated. Care instructions adapted under license by [employer] (which disclaims liability or warranty for this information). If you have questions about a medical condition or this instruction, always ask your healthcare professional. [address] any warranty or liability for your use of this information.

## 2017-12-01 RX ORDER — ATORVASTATIN CALCIUM 10 MG/1
TABLET, FILM COATED ORAL
Qty: 45 TAB | Refills: 0 | Status: SHIPPED | OUTPATIENT
Start: 2017-12-01 | End: 2018-03-02 | Stop reason: SDUPTHER

## 2017-12-14 RX ORDER — COLESEVELAM HYDROCHLORIDE 625 MG/1
TABLET, FILM COATED ORAL
Qty: 540 TAB | Refills: 1 | Status: SHIPPED | OUTPATIENT
Start: 2017-12-14 | End: 2018-06-21

## 2018-01-09 ENCOUNTER — TELEPHONE (OUTPATIENT)
Dept: INTERNAL MEDICINE CLINIC | Age: 73
End: 2018-01-09

## 2018-01-09 NOTE — TELEPHONE ENCOUNTER
Patient came in requesting referral for ENT doctor. He has been experiencing issues with one side of his nostril. Thick mucous and blood. Please refer to ENT.

## 2018-01-10 NOTE — TELEPHONE ENCOUNTER
Called patient to let him know, Per Dr. Sheldon Sanchez, he is referring him to Dr. Charlene hartley. Collette is going to set up this appointment for him.

## 2018-01-11 ENCOUNTER — TELEPHONE (OUTPATIENT)
Dept: INTERNAL MEDICINE CLINIC | Age: 73
End: 2018-01-11

## 2018-01-29 DIAGNOSIS — Z23 ENCOUNTER FOR IMMUNIZATION: ICD-10-CM

## 2018-01-30 RX ORDER — EMPAGLIFLOZIN 10 MG/1
TABLET, FILM COATED ORAL
Qty: 30 TAB | Refills: 2 | Status: SHIPPED | OUTPATIENT
Start: 2018-01-30 | End: 2018-02-05 | Stop reason: SDUPTHER

## 2018-02-05 DIAGNOSIS — Z23 ENCOUNTER FOR IMMUNIZATION: ICD-10-CM

## 2018-02-05 NOTE — TELEPHONE ENCOUNTER
Requested Prescriptions     Pending Prescriptions Disp Refills    empagliflozin (JARDIANCE) 10 mg tablet 90 Tab 3     Sig: TAKE 1 TABLET BY MOUTH EVERY MORNING

## 2018-02-06 NOTE — TELEPHONE ENCOUNTER
Called patient, per Dr. Ashley Estrada, to let him know that he is sending in Collins into [facility].

## 2018-02-06 NOTE — TELEPHONE ENCOUNTER
Dr Danice Sandhoff prescribed Sonjia Francia which is not covered by insurance. Abby Anand and reva are Covered.

## 2018-03-01 RX ORDER — SITAGLIPTIN 100 MG/1
TABLET, FILM COATED ORAL
Qty: 90 TAB | Refills: 0 | Status: SHIPPED | OUTPATIENT
Start: 2018-03-01 | End: 2018-05-27 | Stop reason: SDUPTHER

## 2018-03-02 RX ORDER — ATORVASTATIN CALCIUM 10 MG/1
TABLET, FILM COATED ORAL
Qty: 45 TAB | Refills: 0 | Status: SHIPPED | OUTPATIENT
Start: 2018-03-02 | End: 2018-05-27 | Stop reason: SDUPTHER

## 2018-03-12 RX ORDER — SERTRALINE HYDROCHLORIDE 100 MG/1
TABLET, FILM COATED ORAL
Qty: 90 TAB | Refills: 0 | Status: SHIPPED | OUTPATIENT
Start: 2018-03-12 | End: 2018-06-07 | Stop reason: SDUPTHER

## 2018-04-24 RX ORDER — RAMIPRIL 10 MG/1
CAPSULE ORAL
Qty: 90 CAP | Refills: 1 | Status: SHIPPED | OUTPATIENT
Start: 2018-04-24 | End: 2018-06-21

## 2018-05-27 RX ORDER — ATORVASTATIN CALCIUM 10 MG/1
TABLET, FILM COATED ORAL
Qty: 45 TAB | Refills: 0 | Status: SHIPPED | OUTPATIENT
Start: 2018-05-27 | End: 2018-09-22 | Stop reason: SDUPTHER

## 2018-05-27 RX ORDER — SITAGLIPTIN 100 MG/1
TABLET, FILM COATED ORAL
Qty: 90 TAB | Refills: 0 | Status: SHIPPED | OUTPATIENT
Start: 2018-05-27 | End: 2018-06-13 | Stop reason: ALTCHOICE

## 2018-05-29 ENCOUNTER — OFFICE VISIT (OUTPATIENT)
Dept: INTERNAL MEDICINE CLINIC | Age: 73
End: 2018-05-29

## 2018-05-29 VITALS
SYSTOLIC BLOOD PRESSURE: 132 MMHG | HEART RATE: 71 BPM | BODY MASS INDEX: 30.22 KG/M2 | WEIGHT: 228 LBS | OXYGEN SATURATION: 97 % | RESPIRATION RATE: 18 BRPM | DIASTOLIC BLOOD PRESSURE: 68 MMHG | TEMPERATURE: 98.8 F | HEIGHT: 73 IN

## 2018-05-29 DIAGNOSIS — I10 ESSENTIAL HYPERTENSION, BENIGN: ICD-10-CM

## 2018-05-29 DIAGNOSIS — Z79.4 CONTROLLED TYPE 2 DIABETES MELLITUS WITHOUT COMPLICATION, WITH LONG-TERM CURRENT USE OF INSULIN (HCC): Primary | ICD-10-CM

## 2018-05-29 DIAGNOSIS — E11.9 CONTROLLED TYPE 2 DIABETES MELLITUS WITHOUT COMPLICATION, WITH LONG-TERM CURRENT USE OF INSULIN (HCC): Primary | ICD-10-CM

## 2018-05-29 DIAGNOSIS — Z23 ENCOUNTER FOR IMMUNIZATION: ICD-10-CM

## 2018-05-29 DIAGNOSIS — E78.2 MIXED HYPERLIPIDEMIA: ICD-10-CM
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
## 2018-05-31 DIAGNOSIS — Z23 ENCOUNTER FOR IMMUNIZATION: ICD-10-CM

## 2018-06-01 DIAGNOSIS — Z23 ENCOUNTER FOR IMMUNIZATION: ICD-10-CM

## 2018-06-01 NOTE — TELEPHONE ENCOUNTER
Requested Prescriptions     Pending Prescriptions Disp Refills    empagliflozin (JARDIANCE) 10 mg tablet 90 Tab 1     Sig: TAKE 1 TABLET BY MOUTH EVERY MORNING     90 day supply please

## 2018-06-05 DIAGNOSIS — Z23 ENCOUNTER FOR IMMUNIZATION: ICD-10-CM

## 2018-06-05 NOTE — TELEPHONE ENCOUNTER
Requested Prescriptions     Pending Prescriptions Disp Refills    empagliflozin (JARDIANCE) 10 mg tablet 90 Tab 1     Sig: TAKE 1 TABLET BY MOUTH EVERY MORNING

## 2018-06-07 RX ORDER — SERTRALINE HYDROCHLORIDE 100 MG/1
TABLET, FILM COATED ORAL
Qty: 90 TAB | Refills: 0 | Status: SHIPPED | OUTPATIENT
Start: 2018-06-07 | End: 2018-09-07 | Stop reason: SDUPTHER

## 2018-06-11 ENCOUNTER — TELEPHONE (OUTPATIENT)
Dept: INTERNAL MEDICINE CLINIC | Age: 73
End: 2018-06-11

## 2018-06-11 NOTE — TELEPHONE ENCOUNTER
Patients wife called because patients Blood Sugar over the weekend was 400 one of his friends told him to go on the Atkins Diet and she would like how Dr Karan Browne feels about that with the patients Diabetes or if there is a better Diet he could recommend,. Please call her at [phone]

## 2018-06-12 NOTE — TELEPHONE ENCOUNTER
Spoke with patient and advised him that Dr Sarah Beach advised that that diet would be ok for him  Patient verbalized understanding

## 2018-06-13 ENCOUNTER — OFFICE VISIT (OUTPATIENT)
Dept: INTERNAL MEDICINE CLINIC | Age: 73
End: 2018-06-13

## 2018-06-13 VITALS
WEIGHT: 228 LBS | BODY MASS INDEX: 30.22 KG/M2 | HEART RATE: 70 BPM | SYSTOLIC BLOOD PRESSURE: 142 MMHG | OXYGEN SATURATION: 97 % | RESPIRATION RATE: 20 BRPM | DIASTOLIC BLOOD PRESSURE: 66 MMHG | HEIGHT: 73 IN | TEMPERATURE: 98.5 F

## 2018-06-13 DIAGNOSIS — L08.9 INFECTED FINGER: ICD-10-CM

## 2018-06-13 DIAGNOSIS — E11.9 CONTROLLED TYPE 2 DIABETES MELLITUS WITHOUT COMPLICATION, WITH LONG-TERM CURRENT USE OF INSULIN (HCC): Primary | ICD-10-CM

## 2018-06-13 DIAGNOSIS — Z79.4 CONTROLLED TYPE 2 DIABETES MELLITUS WITHOUT COMPLICATION, WITH LONG-TERM CURRENT USE OF INSULIN (HCC): Primary | ICD-10-CM

## 2018-06-13 RX ORDER — TRIAMCINOLONE ACETONIDE 1 MG/G
CREAM TOPICAL
Qty: 45 G | Refills: 0 | Status: SHIPPED | OUTPATIENT
Start: 2018-06-13 | End: 2018-11-12

## 2018-06-13 RX ORDER — CEPHALEXIN 500 MG/1
CAPSULE ORAL
Qty: 25 CAP | Refills: 0 | Status: SHIPPED | OUTPATIENT
Start: 2018-06-13 | End: 2018-07-19 | Stop reason: ALTCHOICE

## 2018-06-13 NOTE — MR AVS SNAPSHOT
[address] [place] [address] 
 
 
 [address], [address] [facility] [address] 
[phone] Patient: Raj Lee MRN: [id number] Visit Information Date & Time Provider Department Dept. Phone Encounter #  
 6/13/2018  8:45 AM Emilia Daniel MD [facility] INTERNAL MEDICINE [facility] [phone] [id number] Follow-up Instructions Return in about 1 week (around 6/20/2018). Your Appointments 6/20/2018  9:30 AM  
Follow Up with Emilia Daniel MD  
[facility] INTERNAL MEDICINE OF [facility]) Appt Note: 1wk  
 [address], [address] [place] [address]  
[phone]  
  
   
 [address], [address] [facility] [address]  
  
    
 8/28/2018  2:00 PM  
Office Visit with Emilia Daniel MD  
[facility] INTERNAL MEDICINE OF [facility]) Appt Note: 3 mo f/u  
 [address], [address] [facility] [address]  
  
   
 [address], [address] [facility] [address] Upcoming Health Maintenance Date Due Hepatitis C Screening 1945 LIPID PANEL Q1 10/20/2017 HEMOGLOBIN A1C Q6M 3/21/2018 EYE EXAM RETINAL OR DILATED Q1 3/30/2018 Influenza Age 5 to Adult 8/1/2018 MICROALBUMIN Q1 9/27/2018 MEDICARE YEARLY EXAM 12/1/2018 GLAUCOMA SCREENING Q2Y 3/30/2019 FOOT EXAM Q1 5/29/2019 COLONOSCOPY 11/10/2020 DTaP/Tdap/Td series (2 - Td) 11/30/2027 Allergies as of 6/13/2018  Review Complete On: 6/13/2018 By: Emilia Daniel MD  
 No Known Allergies Current Immunizations  Reviewed on 9/19/2017 Name Date Influenza High Dose Vaccine PF 9/5/2017, 10/20/2016 Influenza Vaccine Adolph Gamino) 10/5/2015 Pneumococcal Conjugate (PCV-13) 10/5/2015 Pneumococcal Polysaccharide (PPSV-23) 6/14/2012 12:00 AM, 6/8/2009 Not reviewed this visit Vitals BP Pulse Temp Resp Height(growth percentile) 142/66 (BP 1 Location: Right arm, BP Patient Position: Sitting) 70 98.5 °F (36.9 °C) (Tympanic) 20 6' 1\" (1.854 m) Weight(growth percentile) SpO2 BMI Smoking Status 228 lb (103.4 kg) 97% 30.08 kg/m2 Never Smoker Vitals History BMI and BSA Data Body Mass Index Body Surface Area 30.08 kg/m 2 2.31 m 2 Preferred Pharmacy Pharmacy Name Phone [facility]/PHARMACY #[id number]- [address] [place] [address] [place] [phone] Your Updated Medication List  
  
   
This list is accurate as of 6/13/18  9:22 AM.  Always use your most recent med list.  
  
  
  
  
 atorvastatin 10 mg tablet Commonly known as:  LIPITOR  
TAKE 1/2 TABLET BY MOUTH EVERY DAY  
  
 cephALEXin 500 mg capsule Commonly known as:  KEFLEX  
1 cap three times per day * dapagliflozin 10 mg Tab tablet Commonly known as:  [employer] Take 1 Tab by mouth daily. * dapagliflozin 5 mg Tab tablet Commonly known as:  [employer] Take 1 Tab by mouth daily. empagliflozin 10 mg tablet Commonly known as:  JARDIANCE  
TAKE 1 TABLET BY MOUTH EVERY MORNING  
  
 gabapentin 100 mg capsule Commonly known as:  NEURONTIN  
TAKE 1 CAPSULE BY MOUTH IN THE AM, 1 CAPSULE IN THE MID DAY, 2 CAPSULES AT BEDTIME  
  
 glimepiride 4 mg tablet Commonly known as:  AMARYL  
TAKE 1 AND 1/2 TABLET BY MOUTH IN THE MORNING ABD 1/2 TABLET EVERY MORNING  
  
 JANUVIA 100 mg tablet Generic drug:  SITagliptin TAKE 1 TABLET BY MOUTH DAILY  
  
 ondansetron 4 mg disintegrating tablet Commonly known as:  ZOFRAN ODT Take 1 Tab by mouth every eight (8) hours as needed for Nausea. pioglitazone-metFORMIN  mg per tablet Commonly known as:  ACTOPLUS MET  
TAKE 1 TABLET BY MOUTH TWICE A DAY  
  
 ramipril 10 mg capsule Commonly known as:  ALTACE  
TAKE 1 CAPSULE BY MOUTH DAILY. sertraline 100 mg tablet Commonly known as:  ZOLOFT  
TAKE 1 TABLET BY MOUTH DAILY  
  
 triamcinolone acetonide 0.1 % topical cream  
 Commonly known as:  KENALOG Apply twice per day WELCHOL 625 mg tablet Generic drug:  colesevelam  
TAKE 3 TABLETS BY MOUTH TWICE A DAY WITH MEALS * Notice: This list has 2 medication(s) that are the same as other medications prescribed for you. Read the directions carefully, and ask your doctor or other care provider to review them with you. Prescriptions Sent to Pharmacy Refills  
 cephALEXin (KEFLEX) 500 mg capsule 0 Si cap three times per day Class: Normal  
 Pharmacy: [facility]/pharmacy #[id number] - [address] [facility] [place]i Ramires Ph #: [phone]  
 triamcinolone acetonide (KENALOG) 0.1 % topical cream 0 Sig: Apply twice per day Class: Normal  
 Pharmacy: [place] [facility] Ph #: [phone] Follow-up Instructions Return in about 1 week (around 2018). Introducing [facility] SERVICES! Emigdio Pérez introduces Comeks patient portal. Now you can access parts of your medical record, email your doctor's office, and request medication refills online. 1. In your internet browser, go to [email or web address] 2. Click on the First Time User? Click Here link in the Sign In box. You will see the New Member Sign Up page. 3. Enter your Comeks Access Code exactly as it appears below. You will not need to use this code after youve completed the sign-up process. If you do not sign up before the expiration date, you must request a new code. · Comeks Access Code: [email or web address] Expires: 2018  8:50 AM 
 
4. Enter the last four digits of your Social Security Number (xxxx) and Date of Birth (mm/dd/yyyy) as indicated and click Submit. You will be taken to the next sign-up page. 5. Create a Aryngat ID. This will be your Comeks login ID and cannot be changed, so think of one that is secure and easy to remember. 6. Create a Aryngat password. You can change your password at any time. 7. Enter your Password Reset Question and Answer. This can be used at a later time if you forget your password. 8. Enter your e-mail address. You will receive e-mail notification when new information is available in [address] 9. Click Sign Up. You can now view and download portions of your medical record. 10. Click the Download Summary menu link to download a portable copy of your medical information. If you have questions, please visit the Frequently Asked Questions section of the [employer] website. Remember, [employer] is NOT to be used for urgent needs. For medical emergencies, dial 911. Now available from your iPhone and Android! Please provide this summary of care documentation to your next provider. Your primary care clinician is listed as Amos Ac. If you have any questions after today's visit, please call [phone].

## 2018-06-13 NOTE — PROGRESS NOTES
1. Have you been to the ER, urgent care clinic since your last visit? Hospitalized since your last visit? No    2. Have you seen or consulted any other health care providers outside of the [address] [place] [address] since your last visit? Include any pap smears or colon screening.  No
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
## 2018-06-20 ENCOUNTER — OFFICE VISIT (OUTPATIENT)
Dept: INTERNAL MEDICINE CLINIC | Age: 73
End: 2018-06-20

## 2018-06-20 ENCOUNTER — HOSPITAL ENCOUNTER (OUTPATIENT)
Dept: LAB | Age: 73
Discharge: HOME | End: 2018-06-20
Payer: MEDICARE

## 2018-06-20 VITALS
DIASTOLIC BLOOD PRESSURE: 70 MMHG | TEMPERATURE: 97.8 F | RESPIRATION RATE: 18 BRPM | OXYGEN SATURATION: 97 % | HEART RATE: 66 BPM | SYSTOLIC BLOOD PRESSURE: 146 MMHG | BODY MASS INDEX: 28.89 KG/M2 | HEIGHT: 73 IN | WEIGHT: 218 LBS

## 2018-06-20 DIAGNOSIS — Z12.5 PROSTATE CANCER SCREENING: ICD-10-CM

## 2018-06-20 DIAGNOSIS — E11.9 CONTROLLED TYPE 2 DIABETES MELLITUS WITHOUT COMPLICATION, WITH LONG-TERM CURRENT USE OF INSULIN (HCC): ICD-10-CM

## 2018-06-20 DIAGNOSIS — E11.9 CONTROLLED TYPE 2 DIABETES MELLITUS WITHOUT COMPLICATION, WITH LONG-TERM CURRENT USE OF INSULIN (HCC): Primary | ICD-10-CM

## 2018-06-20 DIAGNOSIS — I10 ESSENTIAL HYPERTENSION, BENIGN: ICD-10-CM

## 2018-06-20 DIAGNOSIS — Z79.4 CONTROLLED TYPE 2 DIABETES MELLITUS WITHOUT COMPLICATION, WITH LONG-TERM CURRENT USE OF INSULIN (HCC): ICD-10-CM

## 2018-06-20 DIAGNOSIS — E78.2 MIXED HYPERLIPIDEMIA: ICD-10-CM

## 2018-06-20 DIAGNOSIS — Z79.4 CONTROLLED TYPE 2 DIABETES MELLITUS WITHOUT COMPLICATION, WITH LONG-TERM CURRENT USE OF INSULIN (HCC): Primary | ICD-10-CM

## 2018-06-20 LAB
ALBUMIN SERPL-MCNC: 3.8 G/DL (ref 3.4–5)
ALBUMIN/GLOB SERPL: 1.1 {RATIO} (ref 0.8–1.7)
ALP SERPL-CCNC: 68 U/L (ref 45–117)
ALT SERPL-CCNC: 35 U/L (ref 16–61)
ANION GAP SERPL CALC-SCNC: 11 MMOL/L (ref 3–18)
AST SERPL-CCNC: 28 U/L (ref 15–37)
BASOPHILS # BLD: 0 K/UL (ref 0–0.06)
BASOPHILS NFR BLD: 1 % (ref 0–2)
BILIRUB SERPL-MCNC: 0.6 MG/DL (ref 0.2–1)
BUN SERPL-MCNC: 21 MG/DL (ref 7–18)
BUN/CREAT SERPL: 26 (ref 12–20)
CALCIUM SERPL-MCNC: 8.9 MG/DL (ref 8.5–10.1)
CHLORIDE SERPL-SCNC: 103 MMOL/L (ref 100–108)
CHOLEST SERPL-MCNC: 191 MG/DL
CO2 SERPL-SCNC: 25 MMOL/L (ref 21–32)
CREAT SERPL-MCNC: 0.81 MG/DL (ref 0.6–1.3)
DIFFERENTIAL METHOD BLD: ABNORMAL
EOSINOPHIL # BLD: 0.2 K/UL (ref 0–0.4)
EOSINOPHIL NFR BLD: 4 % (ref 0–5)
ERYTHROCYTE [DISTWIDTH] IN BLOOD BY AUTOMATED COUNT: 14.7 % (ref 11.6–14.5)
EST. AVERAGE GLUCOSE BLD GHB EST-MCNC: 206 MG/DL
GLOBULIN SER CALC-MCNC: 3.4 G/DL (ref 2–4)
GLUCOSE SERPL-MCNC: 158 MG/DL (ref 74–99)
HBA1C MFR BLD: 8.8 % (ref 4.2–5.6)
HCT VFR BLD AUTO: 41.8 % (ref 36–48)
HDLC SERPL-MCNC: 50 MG/DL (ref 40–60)
HDLC SERPL: 3.8 {RATIO} (ref 0–5)
HGB BLD-MCNC: 13.7 G/DL (ref 13–16)
LDLC SERPL CALC-MCNC: 111 MG/DL (ref 0–100)
LIPID PROFILE,FLP: ABNORMAL
LYMPHOCYTES # BLD: 1.7 K/UL (ref 0.9–3.6)
LYMPHOCYTES NFR BLD: 29 % (ref 21–52)
MCH RBC QN AUTO: 30 PG (ref 24–34)
MCHC RBC AUTO-ENTMCNC: 32.8 G/DL (ref 31–37)
MCV RBC AUTO: 91.5 FL (ref 74–97)
MONOCYTES # BLD: 0.5 K/UL (ref 0.05–1.2)
MONOCYTES NFR BLD: 8 % (ref 3–10)
NEUTS SEG # BLD: 3.4 K/UL (ref 1.8–8)
NEUTS SEG NFR BLD: 58 % (ref 40–73)
PLATELET # BLD AUTO: 223 K/UL (ref 135–420)
PMV BLD AUTO: 10.1 FL (ref 9.2–11.8)
POTASSIUM SERPL-SCNC: 4.3 MMOL/L (ref 3.5–5.5)
PROT SERPL-MCNC: 7.2 G/DL (ref 6.4–8.2)
RBC # BLD AUTO: 4.57 M/UL (ref 4.7–5.5)
SODIUM SERPL-SCNC: 139 MMOL/L (ref 136–145)
TRIGL SERPL-MCNC: 150 MG/DL (ref ?–150)
VLDLC SERPL CALC-MCNC: 30 MG/DL
WBC # BLD AUTO: 5.8 K/UL (ref 4.6–13.2)

## 2018-06-20 PROCEDURE — 82043 UR ALBUMIN QUANTITATIVE: CPT | Performed by: INTERNAL MEDICINE

## 2018-06-20 PROCEDURE — 80061 LIPID PANEL: CPT | Performed by: INTERNAL MEDICINE

## 2018-06-20 PROCEDURE — 85025 COMPLETE CBC W/AUTO DIFF WBC: CPT | Performed by: INTERNAL MEDICINE

## 2018-06-20 PROCEDURE — 36415 COLL VENOUS BLD VENIPUNCTURE: CPT | Performed by: INTERNAL MEDICINE

## 2018-06-20 PROCEDURE — 83036 HEMOGLOBIN GLYCOSYLATED A1C: CPT | Performed by: INTERNAL MEDICINE

## 2018-06-20 PROCEDURE — 80053 COMPREHEN METABOLIC PANEL: CPT | Performed by: INTERNAL MEDICINE

## 2018-06-20 NOTE — PROGRESS NOTES
1. Have you been to the ER, urgent care clinic since your last visit? Hospitalized since your last visit? No    2. Have you seen or consulted any other health care providers outside of the [address] [place] [address] since your last visit? Include any pap smears or colon screening.  No

## 2018-06-21 ENCOUNTER — OFFICE VISIT (OUTPATIENT)
Dept: INTERNAL MEDICINE CLINIC | Age: 73
End: 2018-06-21

## 2018-06-21 VITALS
OXYGEN SATURATION: 97 % | WEIGHT: 219 LBS | DIASTOLIC BLOOD PRESSURE: 64 MMHG | HEART RATE: 60 BPM | RESPIRATION RATE: 18 BRPM | BODY MASS INDEX: 29.03 KG/M2 | HEIGHT: 73 IN | TEMPERATURE: 97.7 F | SYSTOLIC BLOOD PRESSURE: 108 MMHG

## 2018-06-21 DIAGNOSIS — Z71.3 DIETARY COUNSELING: ICD-10-CM

## 2018-06-21 DIAGNOSIS — Z71.89 ENCOUNTER FOR MEDICATION REVIEW AND COUNSELING: Primary | ICD-10-CM

## 2018-06-21 LAB
CREAT UR-MCNC: 65.37 MG/DL (ref 30–125)
MICROALBUMIN UR-MCNC: 0.5 MG/DL (ref 0–3)
MICROALBUMIN/CREAT UR-RTO: 8 MG/G (ref 0–30)

## 2018-06-21 RX ORDER — ASPIRIN 81 MG/1
81 TABLET ORAL DAILY
COMMUNITY
End: 2019-08-13 | Stop reason: ALTCHOICE

## 2018-06-21 RX ORDER — IBUPROFEN 200 MG
600 TABLET ORAL
COMMUNITY
End: 2018-10-04 | Stop reason: ALTCHOICE

## 2018-06-21 RX ORDER — METFORMIN HYDROCHLORIDE 500 MG/1
TABLET, EXTENDED RELEASE ORAL
Qty: 90 TAB | Refills: 1 | Status: SHIPPED | OUTPATIENT
Start: 2018-06-21 | End: 2018-11-24 | Stop reason: SDUPTHER

## 2018-06-21 NOTE — PATIENT INSTRUCTIONS
Please start metformin  mg tablet once daily after dinner. Please track your blood sugars first thing in the morning/fasting and alternate the other times of day. Also check your sugars if you feel dizzy. Bring your logs to your next appointment. Start working on the [employer] control or other dietary changes and continue exercise/walking.

## 2018-06-21 NOTE — PROGRESS NOTES
Chief Complaint   Patient presents with    Medication Evaluation     Uncontrolled Diabetes, review of labs from yesterday    Patient Education     Diabetic diet education         Is someone accompanying this pt? yes    Is the patient using any DME equipment during OV? no    Depression Screening:  PHQ over the last two weeks 6/21/2018 10/30/2017 10/20/2016 6/13/2016 10/5/2015   Little interest or pleasure in doing things Not at all Not at all Not at all Not at all Not at all   Feeling down, depressed or hopeless Not at all Not at all Not at all - Not at all   Total Score PHQ 2 0 0 0 - 0       Learning Assessment:  Learning Assessment 6/13/2016   PRIMARY LEARNER Patient   HIGHEST LEVEL OF EDUCATION - PRIMARY LEARNER  DID NOT GRADUATE [facility] PRIMARY LEARNER NONE   CO-LEARNER CAREGIVER No   CO-LEARNER NAME bia cronin   CO-LEARNER HIGHEST LEVEL OF EDUCATION GRADUATED HIGH SCHOOL OR GED   [facility]    NEED No   LEARNER PREFERENCE PRIMARY PICTURES   ANSWERED BY patient   RELATIONSHIP SELF       Abuse Screening:  Abuse Screening Questionnaire 6/13/2016   Do you ever feel afraid of your partner? N   Are you in a relationship with someone who physically or mentally threatens you? N   Is it safe for you to go home? Y       Fall Risk  Fall Risk Assessment, last 12 mths 6/21/2018 10/30/2017 10/20/2016 6/13/2016 10/5/2015   Able to walk? Yes Yes Yes Yes Yes   Fall in past 12 months? No Yes No No No   Number of falls in past 12 months - 2 - - -         Health Maintenance reviewed and discussed per provider.     Pt is due for   Health Maintenance   Topic    Hepatitis C Screening     EYE EXAM RETINAL OR DILATED Q1     Influenza Age 5 to Adult     HEMOGLOBIN A1C Q6M     GLAUCOMA SCREENING Q2Y     FOOT EXAM Q1     MICROALBUMIN Q1     LIPID PANEL Q1     COLONOSCOPY     DTaP/Tdap/Td series (2 - Td)    ZOSTER VACCINE AGE 60>     Pneumococcal 65+ Low/Medium Risk       Please order/place referral if appropriate. Pt currently taking Antiplatelet therapy? ASA 81 mg      Coordination of Care:  1. Have you been to the ER, urgent care clinic since your last visit? Hospitalized since your last visit? no    2. Have you seen or consulted any other health care providers outside of the [address] [place] [address] since your last visit? Include any pap smears or colon screening. no    Please see Red banners under Allergies, Med rec, Immunizations to remove outside inquires. All correct information has been verified with patient and added to chart.

## 2018-06-21 NOTE — MR AVS SNAPSHOT
[address] [place] [address] 
 
 
 [address], [address] [place] [address] 
[phone] Patient: Vida Tidwell MRN: [id number] HGE:[id number] Visit Information Date & Time Provider Department Dept. Phone Encounter #  
 6/21/2018 10:15 AM Lamont Awan MD [facility] INTERNAL MEDICINE  Eliza Garcia [phone] [id number] Follow-up Instructions Return in about 4 weeks (around 7/19/2018). Your Appointments 7/19/2018 12:45 PM  
Follow Up with Lamont Awan MD  
[facility] INTERNAL MEDICINE OF [address]) Appt Note: 4WK  
 [address], [address] [place] [address]  
[phone]  
  
   
 [address], [phone]  
  
    
 8/28/2018  2:00 PM  
Office Visit with Lamont Awan MD  
[facility] INTERNAL MEDICINE OF [address]) Appt Note: 3 mo f/u  
 [address], [address] [place] [address]  
  
   
 [address], [address] [facility] [address] Upcoming Health Maintenance Date Due Hepatitis C Screening 1945 EYE EXAM RETINAL OR DILATED Q1 3/30/2018 Influenza Age 5 to Adult 8/1/2018 HEMOGLOBIN A1C Q6M 12/20/2018 GLAUCOMA SCREENING Q2Y 3/30/2019 FOOT EXAM Q1 5/29/2019 MICROALBUMIN Q1 6/20/2019 LIPID PANEL Q1 6/20/2019 COLONOSCOPY 11/10/2020 DTaP/Tdap/Td series (2 - Td) 11/30/2027 Allergies as of 6/21/2018  Review Complete On: 6/21/2018 By: [place] Carrasco, PHARMD  
 No Known Allergies Current Immunizations  Reviewed on 6/20/2018 Name Date Influenza High Dose Vaccine PF 9/5/2017, 10/20/2016 Influenza Vaccine Sharif Kappa) 10/5/2015 Pneumococcal Conjugate (PCV-13) 10/5/2015 Pneumococcal Polysaccharide (PPSV-23) 6/14/2012 12:00 AM, 6/8/2009 Not reviewed this visit Vitals BP Pulse Temp Resp Height(growth percentile)  108/64 (BP 1 Location: Left arm, BP Patient Position: Sitting) 60 97.7 °F (36.5 °C) (Tympanic) 18 6' 1\" (1.854 m) Weight(growth percentile) SpO2 BMI Smoking Status 219 lb (99.3 kg) 97% 28.89 kg/m2 Never Smoker BMI and BSA Data Body Mass Index Body Surface Area  
 28.89 kg/m 2 2.26 m 2 Preferred Pharmacy Pharmacy Name Phone [id number] [place], [address] [place] [phone] Your Updated Medication List  
  
   
This list is accurate as of 6/21/18 12:12 PM.  Always use your most recent med list.  
  
  
  
  
 aspirin delayed-release 81 mg tablet Take 81 mg by mouth daily. atorvastatin 10 mg tablet Commonly known as:  LIPITOR  
TAKE 1/2 TABLET BY MOUTH EVERY DAY  
  
 cephALEXin 500 mg capsule Commonly known as:  KEFLEX  
1 cap three times per day  
  
 dapagliflozin 5 mg Tab tablet Commonly known as:  [employer] Take 1 Tab by mouth daily. gabapentin 100 mg capsule Commonly known as:  NEURONTIN  
TAKE 1 CAPSULE BY MOUTH IN THE AM, 1 CAPSULE IN THE MID DAY, 2 CAPSULES AT BEDTIME  
  
 ibuprofen 200 mg tablet Commonly known as:  MOTRIN Take 600 mg by mouth daily as needed (headache (rare use, about monthly)). metFORMIN  mg tablet Commonly known as:  GLUCOPHAGE XR  
1 tablet daily at supper  
  
 sertraline 100 mg tablet Commonly known as:  ZOLOFT  
TAKE 1 TABLET BY MOUTH DAILY  
  
 triamcinolone acetonide 0.1 % topical cream  
Commonly known as:  KENALOG Apply twice per day Follow-up Instructions Return in about 4 weeks (around 7/19/2018). Patient Instructions Please start metformin  mg tablet once daily after dinner. Please track your blood sugars first thing in the morning/fasting and alternate the other times of day. Also check your sugars if you feel dizzy. Bring your logs to your next appointment. Start working on the [employer] control or other dietary changes and continue exercise/walking. Introducing [facility]! [employer] introduces [employer] patient portal. Now you can access parts of your medical record, email your doctor's office, and request medication refills online. 1. In your internet browser, go to [email or web address] 2. Click on the First Time User? Click Here link in the Sign In box. You will see the New Member Sign Up page. 3. Enter your [employer] Access Code exactly as it appears below. You will not need to use this code after youve completed the sign-up process. If you do not sign up before the expiration date, you must request a new code. · [employer] Access Code: [email or web address] Expires: 9/11/2018  8:50 AM 
 
4. Enter the last four digits of your Social Security Number (xxxx) and Date of Birth (mm/dd/yyyy) as indicated and click Submit. You will be taken to the next sign-up page. 5. Create a [employer] ID. This will be your [employer] login ID and cannot be changed, so think of one that is secure and easy to remember. 6. Create a [employer] password. You can change your password at any time. 7. Enter your Password Reset Question and Answer. This can be used at a later time if you forget your password. 8. Enter your e-mail address. You will receive e-mail notification when new information is available in [address] 9. Click Sign Up. You can now view and download portions of your medical record. 10. Click the Download Summary menu link to download a portable copy of your medical information. If you have questions, please visit the Frequently Asked Questions section of the [employer] website. Remember, [employer] is NOT to be used for urgent needs. For medical emergencies, dial 911. Now available from your iPhone and Android! Please provide this summary of care documentation to your next provider. Your primary care clinician is listed as Yanique Negrete. If you have any questions after today's visit, please call [phone].
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
## 2018-07-02 ENCOUNTER — TELEPHONE (OUTPATIENT)
Dept: INTERNAL MEDICINE CLINIC | Age: 73
End: 2018-07-02
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
## 2018-07-17 ENCOUNTER — OFFICE VISIT (OUTPATIENT)
Dept: INTERNAL MEDICINE CLINIC | Age: 73
End: 2018-07-17

## 2018-07-17 NOTE — MR AVS SNAPSHOT
[address] [place] [address] 
 
 
 [address], [address] [place] [address] 
[phone] Patient: Geoffrey Jones MRN: [id number] HSX:[birth year]1809 Visit Information Date & Time Provider Department Dept. Phone Encounter #  
 7/17/2018  2:45 PM Jannet Durán MD [facility] INTERNAL MEDICINE OF [address] [id number] Your Appointments 7/19/2018 12:45 PM  
Follow Up with Jannet Durán MD  
[facility] Appt Note: 4WK  
 [address], [address] [place] [address]  
[phone]  
  
   
 [address], [facility] [address]  
  
    
 8/28/2018  2:00 PM  
Office Visit with Jannet Durán MD  
[facility]IC[facility] OF [facility]) Appt Note: 3 mo f/u  
 [address], [address] [place] [address]  
  
   
 [address], [address] Located within [facility] [address] Upcoming Health Maintenance Date Due Hepatitis C Screening 1945 EYE EXAM RETINAL OR DILATED Q1 3/30/2018 Influenza Age 5 to Adult 8/1/2018 MEDICARE YEARLY EXAM 12/1/2018 HEMOGLOBIN A1C Q6M 12/20/2018 GLAUCOMA SCREENING Q2Y 3/30/2019 FOOT EXAM Q1 6/20/2019 MICROALBUMIN Q1 6/20/2019 LIPID PANEL Q1 6/20/2019 COLONOSCOPY 11/10/2020 DTaP/Tdap/Td series (2 - Td) 11/30/2027 Allergies as of 7/17/2018  Review Complete On: 6/21/2018 By: Javier Quarles PHARMD  
 No Known Allergies Current Immunizations  Reviewed on 7/17/2018 Name Date Influenza High Dose Vaccine PF 9/5/2017, 10/20/2016 Influenza Vaccine Mittie Shouts) 10/5/2015 Pneumococcal Conjugate (PCV-13) 10/5/2015 Pneumococcal Polysaccharide (PPSV-23) 6/14/2012 12:00 AM, 6/8/2009 Reviewed by Javier Quarles PHARMD on 7/17/2018 at 11:14 AM  
Vitals Smoking Status Never Smoker Preferred Pharmacy Pharmacy Name Phone [id number]- [address] [place] [phone] Your Updated Medication List  
  
   
This list is accurate as of 7/17/18  3:42 PM.  Always use your most recent med list.  
  
  
  
  
 aspirin delayed-release 81 mg tablet Take 81 mg by mouth daily. atorvastatin 10 mg tablet Commonly known as:  LIPITOR  
TAKE 1/2 TABLET BY MOUTH EVERY DAY  
  
 cephALEXin 500 mg capsule Commonly known as:  KEFLEX  
1 cap three times per day  
  
 dapagliflozin 5 mg Tab tablet Commonly known as:  [employer] Take 1 Tab by mouth daily. gabapentin 100 mg capsule Commonly known as:  NEURONTIN  
TAKE 1 CAPSULE BY MOUTH IN THE AM, 1 CAPSULE IN THE MID DAY, 2 CAPSULES AT BEDTIME  
  
 ibuprofen 200 mg tablet Commonly known as:  MOTRIN Take 600 mg by mouth daily as needed (headache (rare use, about monthly)). metFORMIN  mg tablet Commonly known as:  GLUCOPHAGE XR  
1 tablet daily at supper  
  
 sertraline 100 mg tablet Commonly known as:  ZOLOFT  
TAKE 1 TABLET BY MOUTH DAILY  
  
 triamcinolone acetonide 0.1 % topical cream  
Commonly known as:  KENALOG Apply twice per day Introducing [facility]! [facility] introduces Eniram patient portal. Now you can access parts of your medical record, email your doctor's office, and request medication refills online. 1. In your internet browser, go to [email or web address] 2. Click on the First Time User? Click Here link in the Sign In box. You will see the New Member Sign Up page. 3. Enter your Eniram Access Code exactly as it appears below. You will not need to use this code after youve completed the sign-up process. If you do not sign up before the expiration date, you must request a new code. · Eniram Access Code: [email or web address] Expires: 9/11/2018  8:50 AM 
 
4.  Enter the last four digits of your Social Security Number (xxxx) and Date of Birth (mm/dd/yyyy) as indicated and click Submit. You will be taken to the next sign-up page. 5. Create a [employer] ID. This will be your [employer] login ID and cannot be changed, so think of one that is secure and easy to remember. 6. Create a [employer] password. You can change your password at any time. 7. Enter your Password Reset Question and Answer. This can be used at a later time if you forget your password. 8. Enter your e-mail address. You will receive e-mail notification when new information is available in [address] 9. Click Sign Up. You can now view and download portions of your medical record. 10. Click the Download Summary menu link to download a portable copy of your medical information. If you have questions, please visit the Frequently Asked Questions section of the [employer] website. Remember, [employer] is NOT to be used for urgent needs. For medical emergencies, dial 911. Now available from your iPhone and Android! Please provide this summary of care documentation to your next provider. Your primary care clinician is listed as Willa Bracket. If you have any questions after today's visit, please call [phone].

## 2018-07-19 ENCOUNTER — OFFICE VISIT (OUTPATIENT)
Dept: INTERNAL MEDICINE CLINIC | Age: 73
End: 2018-07-19

## 2018-07-19 VITALS
HEIGHT: 73 IN | WEIGHT: 210 LBS | BODY MASS INDEX: 27.83 KG/M2 | HEART RATE: 68 BPM | SYSTOLIC BLOOD PRESSURE: 136 MMHG | DIASTOLIC BLOOD PRESSURE: 64 MMHG

## 2018-07-19 DIAGNOSIS — Z79.4 CONTROLLED TYPE 2 DIABETES MELLITUS WITHOUT COMPLICATION, WITH LONG-TERM CURRENT USE OF INSULIN (HCC): Primary | ICD-10-CM

## 2018-07-19 DIAGNOSIS — E11.9 CONTROLLED TYPE 2 DIABETES MELLITUS WITHOUT COMPLICATION, WITH LONG-TERM CURRENT USE OF INSULIN (HCC): Primary | ICD-10-CM

## 2018-07-19 RX ORDER — PIOGLITAZONE HCL AND METFORMIN HCL 850; 15 MG/1; MG/1
TABLET ORAL
Qty: 180 TAB | Refills: 2 | Status: SHIPPED | OUTPATIENT
Start: 2018-07-19 | End: 2018-07-19

## 2018-07-19 RX ORDER — GLIMEPIRIDE 2 MG/1
TABLET ORAL
Qty: 30 TAB | Refills: 3 | Status: SHIPPED | OUTPATIENT
Start: 2018-07-19 | End: 2019-03-04 | Stop reason: SDUPTHER
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
## 2018-07-21 NOTE — PROGRESS NOTES
The patient continues on Farxgia and Metformin XR. He is tolerating the medications well. He is following his diet. He continues to have drops in his weight. His sugars are doing better but remain slightly high. I advised the patient to add Amaryl at 2 mg -- 1/2 tablet daily. I asked Jose Antonio Candelaria if he has any questions and I answered the questions. Jose Antonio Candelaria states that he understands the treatment plan and agrees with the treatment plan  A total of 10 minutes was spent with the patient. Greater than 50% of this time was spent in discussion of the problem, counseling the patient, and coordinating the care regarding the problem of control of type II diabetes mellitus.

## 2018-07-31 RX ORDER — DAPAGLIFLOZIN 10 MG/1
TABLET, FILM COATED ORAL
Qty: 90 TAB | Refills: 1 | Status: SHIPPED | OUTPATIENT
Start: 2018-07-31 | End: 2019-01-31 | Stop reason: SDUPTHER

## 2018-08-09 ENCOUNTER — OFFICE VISIT (OUTPATIENT)
Dept: INTERNAL MEDICINE CLINIC | Age: 73
End: 2018-08-09

## 2018-08-09 VITALS
WEIGHT: 212 LBS | HEIGHT: 73 IN | RESPIRATION RATE: 16 BRPM | DIASTOLIC BLOOD PRESSURE: 80 MMHG | TEMPERATURE: 97.8 F | HEART RATE: 67 BPM | OXYGEN SATURATION: 97 % | BODY MASS INDEX: 28.1 KG/M2 | SYSTOLIC BLOOD PRESSURE: 138 MMHG

## 2018-08-09 DIAGNOSIS — I10 ESSENTIAL HYPERTENSION, BENIGN: ICD-10-CM

## 2018-08-09 DIAGNOSIS — K59.00 CONSTIPATION, UNSPECIFIED CONSTIPATION TYPE: Primary | ICD-10-CM

## 2018-08-09 RX ORDER — LACTULOSE 10 G/15ML
10 SOLUTION ORAL; RECTAL 2 TIMES DAILY
Qty: 480 ML | Refills: 0 | Status: SHIPPED | OUTPATIENT
Start: 2018-08-09 | End: 2018-08-27 | Stop reason: SDUPTHER

## 2018-08-09 NOTE — PATIENT INSTRUCTIONS
Body Mass Index: Care Instructions  Your Care Instructions    Body mass index (BMI) can help you see if your weight is raising your risk for health problems. It uses a formula to compare how much you weigh with how tall you are. · A BMI lower than 18.5 is considered underweight. · A BMI between 18.5 and 24.9 is considered healthy. · A BMI between 25 and 29.9 is considered overweight. A BMI of 30 or higher is considered obese. If your BMI is in the normal range, it means that you have a lower risk for weight-related health problems. If your BMI is in the overweight or obese range, you may be at increased risk for weight-related health problems, such as high blood pressure, heart disease, stroke, arthritis or joint pain, and diabetes. If your BMI is in the underweight range, you may be at increased risk for health problems such as fatigue, lower protection (immunity) against illness, muscle loss, bone loss, hair loss, and hormone problems. BMI is just one measure of your risk for weight-related health problems. You may be at higher risk for health problems if you are not active, you eat an unhealthy diet, or you drink too much alcohol or use tobacco products. Follow-up care is a key part of your treatment and safety. Be sure to make and go to all appointments, and call your doctor if you are having problems. It's also a good idea to know your test results and keep a list of the medicines you take. How can you care for yourself at home? · Practice healthy eating habits. This includes eating plenty of fruits, vegetables, whole grains, lean protein, and low-fat dairy. · If your doctor recommends it, get more exercise. Walking is a good choice. Bit by bit, increase the amount you walk every day. Try for at least 30 minutes on most days of the week. · Do not smoke. Smoking can increase your risk for health problems. If you need help quitting, talk to your doctor about stop-smoking programs and medicines. These can increase your chances of quitting for good. · Limit alcohol to 2 drinks a day for men and 1 drink a day for women. Too much alcohol can cause health problems. If you have a BMI higher than 25  · Your doctor may do other tests to check your risk for weight-related health problems. This may include measuring the distance around your waist. A waist measurement of more than 40 inches in men or 35 inches in women can increase the risk of weight-related health problems. · Talk with your doctor about steps you can take to stay healthy or improve your health. You may need to make lifestyle changes to lose weight and stay healthy, such as changing your diet and getting regular exercise. If you have a BMI lower than 18.5  · Your doctor may do other tests to check your risk for health problems. · Talk with your doctor about steps you can take to stay healthy or improve your health. You may need to make lifestyle changes to gain or maintain weight and stay healthy, such as getting more healthy foods in your diet and doing exercises to build muscle. Where can you learn more? Go to http://ofelia-mehul.info/. Enter S176 in the search box to learn more about \"Body Mass Index: Care Instructions. \"  Current as of: October 13, 2016  Content Version: 11.4  © 6290-2321 [employer]. Care instructions adapted under license by [employer] (which disclaims liability or warranty for this information). If you have questions about a medical condition or this instruction, always ask your healthcare professional. [address] any warranty or liability for your use of this information. Body Mass Index: Care Instructions  Your Care Instructions    Body mass index (BMI) can help you see if your weight is raising your risk for health problems. It uses a formula to compare how much you weigh with how tall you are.   · A BMI lower than 18.5 is considered underweight. · A BMI between 18.5 and 24.9 is considered healthy. · A BMI between 25 and 29.9 is considered overweight. A BMI of 30 or higher is considered obese. If your BMI is in the normal range, it means that you have a lower risk for weight-related health problems. If your BMI is in the overweight or obese range, you may be at increased risk for weight-related health problems, such as high blood pressure, heart disease, stroke, arthritis or joint pain, and diabetes. If your BMI is in the underweight range, you may be at increased risk for health problems such as fatigue, lower protection (immunity) against illness, muscle loss, bone loss, hair loss, and hormone problems. BMI is just one measure of your risk for weight-related health problems. You may be at higher risk for health problems if you are not active, you eat an unhealthy diet, or you drink too much alcohol or use tobacco products. Follow-up care is a key part of your treatment and safety. Be sure to make and go to all appointments, and call your doctor if you are having problems. It's also a good idea to know your test results and keep a list of the medicines you take. How can you care for yourself at home? · Practice healthy eating habits. This includes eating plenty of fruits, vegetables, whole grains, lean protein, and low-fat dairy. · If your doctor recommends it, get more exercise. Walking is a good choice. Bit by bit, increase the amount you walk every day. Try for at least 30 minutes on most days of the week. · Do not smoke. Smoking can increase your risk for health problems. If you need help quitting, talk to your doctor about stop-smoking programs and medicines. These can increase your chances of quitting for good. · Limit alcohol to 2 drinks a day for men and 1 drink a day for women. Too much alcohol can cause health problems.   If you have a BMI higher than 25  · Your doctor may do other tests to check your risk for weight-related health problems. This may include measuring the distance around your waist. A waist measurement of more than 40 inches in men or 35 inches in women can increase the risk of weight-related health problems. · Talk with your doctor about steps you can take to stay healthy or improve your health. You may need to make lifestyle changes to lose weight and stay healthy, such as changing your diet and getting regular exercise. If you have a BMI lower than 18.5  · Your doctor may do other tests to check your risk for health problems. · Talk with your doctor about steps you can take to stay healthy or improve your health. You may need to make lifestyle changes to gain or maintain weight and stay healthy, such as getting more healthy foods in your diet and doing exercises to build muscle. Where can you learn more? Go to http://ofelia-mehul.info/. Enter S176 in the search box to learn more about \"Body Mass Index: Care Instructions. \"  Current as of: October 13, 2016  Content Version: 11.4  © 6052-4704 [employer]. Care instructions adapted under license by [employer] (which disclaims liability or warranty for this information). If you have questions about a medical condition or this instruction, always ask your healthcare professional. [address] any warranty or liability for your use of this information.

## 2018-08-13 ENCOUNTER — TELEPHONE (OUTPATIENT)
Dept: INTERNAL MEDICINE CLINIC | Age: 73
End: 2018-08-13

## 2018-08-13 NOTE — TELEPHONE ENCOUNTER
Patient called and advised that per Dr Arvind Arboleda he is to be taking 10mg   Patient verbalized understanding

## 2018-08-13 NOTE — TELEPHONE ENCOUNTER
Patient called regarding his Farxgia prescription. He was taking 5 mg in June and prescription was sent in July for 10 mg. The pharmacy questioned this with the patient. I did not see any note from Dr Josephine Turner where medication was increased. Please advise [phone].

## 2018-08-20 ENCOUNTER — OFFICE VISIT (OUTPATIENT)
Dept: INTERNAL MEDICINE CLINIC | Age: 73
End: 2018-08-20

## 2018-08-20 VITALS
TEMPERATURE: 97.9 F | OXYGEN SATURATION: 98 % | DIASTOLIC BLOOD PRESSURE: 72 MMHG | HEIGHT: 73 IN | RESPIRATION RATE: 22 BRPM | BODY MASS INDEX: 27.17 KG/M2 | WEIGHT: 205 LBS | HEART RATE: 61 BPM | SYSTOLIC BLOOD PRESSURE: 138 MMHG

## 2018-08-20 DIAGNOSIS — I10 ESSENTIAL HYPERTENSION, BENIGN: ICD-10-CM

## 2018-08-20 NOTE — PROGRESS NOTES
Chief Complaint   Patient presents with    Well Male       Depression Screening:  PHQ over the last two weeks 6/21/2018   Little interest or pleasure in doing things Not at all   Feeling down, depressed, irritable, or hopeless Not at all   Total Score PHQ 2 0       Learning Assessment:  Learning Assessment 6/13/2016   PRIMARY LEARNER Patient   HIGHEST LEVEL OF EDUCATION - PRIMARY LEARNER  DID NOT GRADUATE HIGH SCHOOL   BARRIERS PRIMARY LEARNER NONE   CO-LEARNER CAREGIVER No   CO-LEARNER NAME bia cronin   CO-LEARNER HIGHEST LEVEL OF EDUCATION GRADUATED HIGH SCHOOL OR GED   BARRIERS CO-LEARNER NONE   PRIMARY LANGUAGE ENGLISH   PRIMARY LANGUAGE [facility] [facility]    NEED No   LEARNER PREFERENCE PRIMARY PICTURES   ANSWERED BY patient   RELATIONSHIP SELF       Abuse Screening:  Abuse Screening Questionnaire 6/13/2016   Do you ever feel afraid of your partner? N   Are you in a relationship with someone who physically or mentally threatens you? N   Is it safe for you to go home? Y       Fall Risk  Fall Risk Assessment, last 12 mths 6/21/2018   Able to walk? Yes   Fall in past 12 months? No   Number of falls in past 12 months -           1. Have you been to the ER, urgent care clinic since your last visit? Hospitalized since your last visit? No    2. Have you seen or consulted any other health care providers outside of the [facility] since your last visit? Include any pap smears or colon screening.  No

## 2018-08-20 NOTE — MR AVS SNAPSHOT
Anthony Bradshaw 
 
 
 [address], [address] [place] [address] 
[phone] Patient: Jim Ruiz MRN: [id number] EHM:[id number] Visit Information Date & Time Provider Department Dept. Phone Encounter #  
 8/20/2018  2:00 PM Danuta Mckeon MD [facility] OF [place] [phone] [id number] Your Appointments 10/1/2018 12:30 PM  
Follow Up with Danuta Mckeon MD  
[facility] INTER[facility] MEDICINE OF [place] [address] [facility]) Appt Note: 2mo  
 [address], [address] [place] [address]  
  
   
 [address], [address] [place] [address] Upcoming Health Maintenance Date Due Hepatitis C Screening 1945 EYE EXAM RETINAL OR DILATED Q1 3/30/2018 Influenza Age 5 to Adult 8/1/2018 MEDICARE YEARLY EXAM 12/1/2018 HEMOGLOBIN A1C Q6M 12/20/2018 GLAUCOMA SCREENING Q2Y 3/30/2019 FOOT EXAM Q1 6/20/2019 MICROALBUMIN Q1 6/20/2019 LIPID PANEL Q1 6/20/2019 COLONOSCOPY 11/10/2020 DTaP/Tdap/Td series (2 - Td) 11/30/2027 Allergies as of 8/20/2018  Review Complete On: 8/20/2018 By: Fredy Lindsey LPN No Known Allergies Current Immunizations  Reviewed on 8/17/2018 Name Date Influenza High Dose Vaccine PF 9/5/2017, 10/20/2016 Influenza Vaccine Gabbi Miu) 10/5/2015 Pneumococcal Conjugate (PCV-13) 10/5/2015 Pneumococcal Polysaccharide (PPSV-23) 6/14/2012 12:00 AM, 6/8/2009 Not reviewed this visit Vitals BP Pulse Temp Resp Height(growth percentile) 138/72 (BP 1 Location: Left arm, BP Patient Position: Sitting) 61 97.9 °F (36.6 °C) (Tympanic) 22 6' 1\" (1.854 m) Weight(growth percentile) SpO2 BMI Smoking Status 205 lb (93 kg) 98% 27.05 kg/m2 Never Smoker BMI and BSA Data Body Mass Index Body Surface Area  
 27.05 kg/m 2 2.19 m 2 Preferred Pharmacy Pharmacy Name Phone [id number]- [address] [place] [phone] Your Updated Medication List  
  
   
This list is accurate as of 8/20/18  2:50 PM.  Always use your most recent med list.  
  
  
  
  
 aspirin delayed-release 81 mg tablet Take 81 mg by mouth daily. atorvastatin 10 mg tablet Commonly known as:  LIPITOR  
TAKE 1/2 TABLET BY MOUTH EVERY DAY  
  
 * dapagliflozin 5 mg Tab tablet Commonly known as:  [employer] Take 1 Tab by mouth daily. * FARXIGA 10 mg Tab tablet Generic drug:  dapagliflozin TAKE 1 TAB BY MOUTH DAILY. gabapentin 100 mg capsule Commonly known as:  NEURONTIN  
TAKE 1 CAPSULE BY MOUTH IN THE AM, 1 CAPSULE IN THE MID DAY, 2 CAPSULES AT BEDTIME  
  
 glimepiride 2 mg tablet Commonly known as:  AMARYL  
1/2 tablet each AM at breakfast  
  
 ibuprofen 200 mg tablet Commonly known as:  MOTRIN Take 600 mg by mouth daily as needed (headache (rare use, about monthly)). lactulose 10 gram/15 mL solution Commonly known as:  Alexandr Barrio Take 15 mL by mouth two (2) times a day. metFORMIN  mg tablet Commonly known as:  GLUCOPHAGE XR  
1 tablet daily at supper  
  
 sertraline 100 mg tablet Commonly known as:  ZOLOFT  
TAKE 1 TABLET BY MOUTH DAILY  
  
 triamcinolone acetonide 0.1 % topical cream  
Commonly known as:  KENALOG Apply twice per day * Notice: This list has 2 medication(s) that are the same as other medications prescribed for you. Read the directions carefully, and ask your doctor or other care provider to review them with you. Introducing [facility]! Boni Smith introduces [employer] patient portal. Now you can access parts of your medical record, email your doctor's office, and request medication refills online. 1. In your internet browser, go to [email or web address] 2. Click on the First Time User? Click Here link in the Sign In box. You will see the New Member Sign Up page. 3. Enter your [employer] Access Code exactly as it appears below. You will not need to use this code after youve completed the sign-up process. If you do not sign up before the expiration date, you must request a new code. · [employer] Access Code: [email or web address] Expires: 9/11/2018  8:50 AM 
 
4. Enter the last four digits of your Social Security Number (xxxx) and Date of Birth (mm/dd/yyyy) as indicated and click Submit. You will be taken to the next sign-up page. 5. Create a [employer] ID. This will be your [employer] login ID and cannot be changed, so think of one that is secure and easy to remember. 6. Create a [employer] password. You can change your password at any time. 7. Enter your Password Reset Question and Answer. This can be used at a later time if you forget your password. 8. Enter your e-mail address. You will receive e-mail notification when new information is available in [address] 9. Click Sign Up. You can now view and download portions of your medical record. 10. Click the Download Summary menu link to download a portable copy of your medical information. If you have questions, please visit the Frequently Asked Questions section of the [employer] website. Remember, [employer] is NOT to be used for urgent needs. For medical emergencies, dial 911. Now available from your iPhone and Android! Please provide this summary of care documentation to your next provider. Your primary care clinician is listed as Chiara Rey. If you have any questions after today's visit, please call [phone].
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
## 2018-08-27 DIAGNOSIS — K59.00 CONSTIPATION, UNSPECIFIED CONSTIPATION TYPE: ICD-10-CM

## 2018-08-27 RX ORDER — LACTULOSE 10 G/15ML
SOLUTION ORAL; RECTAL
Qty: 473 ML | Refills: 0 | Status: SHIPPED | OUTPATIENT
Start: 2018-08-27 | End: 2018-09-08 | Stop reason: SDUPTHER

## 2018-09-07 RX ORDER — SERTRALINE HYDROCHLORIDE 100 MG/1
TABLET, FILM COATED ORAL
Qty: 90 TAB | Refills: 0 | Status: SHIPPED | OUTPATIENT
Start: 2018-09-07 | End: 2018-12-11 | Stop reason: SDUPTHER

## 2018-09-07 RX ORDER — SITAGLIPTIN 100 MG/1
TABLET, FILM COATED ORAL
Qty: 90 TAB | Refills: 0 | Status: SHIPPED | OUTPATIENT
Start: 2018-09-07 | End: 2018-11-12

## 2018-09-08 DIAGNOSIS — K59.00 CONSTIPATION, UNSPECIFIED CONSTIPATION TYPE: ICD-10-CM

## 2018-09-10 RX ORDER — LACTULOSE 10 G/15ML
SOLUTION ORAL; RECTAL
Qty: 473 ML | Refills: 2 | Status: SHIPPED | OUTPATIENT
Start: 2018-09-10 | End: 2018-09-17 | Stop reason: SDUPTHER

## 2018-09-17 DIAGNOSIS — K59.00 CONSTIPATION, UNSPECIFIED CONSTIPATION TYPE: ICD-10-CM

## 2018-09-17 RX ORDER — LACTULOSE 10 G/15ML
SOLUTION ORAL; RECTAL
Qty: 2700 ML | Refills: 2 | Status: SHIPPED | OUTPATIENT
Start: 2018-09-17 | End: 2018-11-12 | Stop reason: DRUGHIGH

## 2018-09-17 NOTE — TELEPHONE ENCOUNTER
Requested Prescriptions     Pending Prescriptions Disp Refills    lactulose (CHRONULAC) 10 gram/15 mL solution 473 mL 2     90 days requested

## 2018-09-26 ENCOUNTER — DOCUMENTATION ONLY (OUTPATIENT)
Dept: INTERNAL MEDICINE CLINIC | Age: 73
End: 2018-09-26

## 2018-09-26 NOTE — PROGRESS NOTES
Pharmacy Note: Immunization Update    Patient: Philip Gary (3 y.o., 1945)     Patient's immunization history was updated to reflect information contained in the [place] and/or outside immunization/pharmacy records were reconciled within [facility]. Health Maintenance schedule updated when appropriate.     Current immunizations now reflect:       Immunization History   Administered Date(s) Administered    Influenza High Dose Vaccine PF 10/20/2016, 09/05/2017, 08/22/2018    Influenza Vaccine (Quad) 10/05/2015    Pneumococcal Conjugate (PCV-13) 10/05/2015    Pneumococcal Polysaccharide (PPSV-23) 06/08/2009, 06/14/2012       Keya Guerra, PharmD, BCACP

## 2018-09-27 RX ORDER — ATORVASTATIN CALCIUM 10 MG/1
TABLET, FILM COATED ORAL
Qty: 45 TAB | Refills: 0 | Status: SHIPPED | OUTPATIENT
Start: 2018-09-27 | End: 2018-11-12

## 2018-09-28 ENCOUNTER — HOSPITAL ENCOUNTER (OUTPATIENT)
Dept: LAB | Age: 73
Discharge: HOME | End: 2018-09-28
Payer: MEDICARE

## 2018-09-28 DIAGNOSIS — E11.9 DIABETES MELLITUS WITHOUT COMPLICATION (HCC): ICD-10-CM

## 2018-09-28 DIAGNOSIS — E78.2 MIXED HYPERLIPIDEMIA: ICD-10-CM

## 2018-09-28 DIAGNOSIS — I10 ESSENTIAL HYPERTENSION, BENIGN: ICD-10-CM

## 2018-09-28 LAB
ALBUMIN SERPL-MCNC: 3.9 G/DL (ref 3.4–5)
ALBUMIN/GLOB SERPL: 1 {RATIO} (ref 0.8–1.7)
ALP SERPL-CCNC: 91 U/L (ref 45–117)
ALT SERPL-CCNC: 22 U/L (ref 16–61)
ANION GAP SERPL CALC-SCNC: 14 MMOL/L (ref 3–18)
AST SERPL-CCNC: 21 U/L (ref 15–37)
BASOPHILS # BLD: 0.1 K/UL (ref 0–0.1)
BASOPHILS NFR BLD: 1 % (ref 0–2)
BILIRUB SERPL-MCNC: 0.5 MG/DL (ref 0.2–1)
BUN SERPL-MCNC: 17 MG/DL (ref 7–18)
BUN/CREAT SERPL: 23 (ref 12–20)
CALCIUM SERPL-MCNC: 8.9 MG/DL (ref 8.5–10.1)
CHLORIDE SERPL-SCNC: 102 MMOL/L (ref 100–108)
CHOLEST SERPL-MCNC: 209 MG/DL
CO2 SERPL-SCNC: 26 MMOL/L (ref 21–32)
CREAT SERPL-MCNC: 0.74 MG/DL (ref 0.6–1.3)
DIFFERENTIAL METHOD BLD: ABNORMAL
EOSINOPHIL # BLD: 0.3 K/UL (ref 0–0.4)
EOSINOPHIL NFR BLD: 6 % (ref 0–5)
ERYTHROCYTE [DISTWIDTH] IN BLOOD BY AUTOMATED COUNT: 13.9 % (ref 11.6–14.5)
EST. AVERAGE GLUCOSE BLD GHB EST-MCNC: 183 MG/DL
GLOBULIN SER CALC-MCNC: 3.9 G/DL (ref 2–4)
GLUCOSE SERPL-MCNC: 129 MG/DL (ref 74–99)
HBA1C MFR BLD: 8 % (ref 4.2–5.6)
HCT VFR BLD AUTO: 44.9 % (ref 36–48)
HDLC SERPL-MCNC: 56 MG/DL (ref 40–60)
HDLC SERPL: 3.7 {RATIO} (ref 0–5)
HGB BLD-MCNC: 14.4 G/DL (ref 13–16)
LDLC SERPL CALC-MCNC: 131.6 MG/DL (ref 0–100)
LIPID PROFILE,FLP: ABNORMAL
LYMPHOCYTES # BLD: 1.6 K/UL (ref 0.9–3.6)
LYMPHOCYTES NFR BLD: 29 % (ref 21–52)
MCH RBC QN AUTO: 29.9 PG (ref 24–34)
MCHC RBC AUTO-ENTMCNC: 32.1 G/DL (ref 31–37)
MCV RBC AUTO: 93.2 FL (ref 74–97)
MONOCYTES # BLD: 0.5 K/UL (ref 0.05–1.2)
MONOCYTES NFR BLD: 10 % (ref 3–10)
NEUTS SEG # BLD: 3.1 K/UL (ref 1.8–8)
NEUTS SEG NFR BLD: 54 % (ref 40–73)
PLATELET # BLD AUTO: 218 K/UL (ref 135–420)
PMV BLD AUTO: 10.2 FL (ref 9.2–11.8)
POTASSIUM SERPL-SCNC: 4.7 MMOL/L (ref 3.5–5.5)
PROT SERPL-MCNC: 7.8 G/DL (ref 6.4–8.2)
RBC # BLD AUTO: 4.82 M/UL (ref 4.7–5.5)
SODIUM SERPL-SCNC: 142 MMOL/L (ref 136–145)
TRIGL SERPL-MCNC: 107 MG/DL (ref ?–150)
VLDLC SERPL CALC-MCNC: 21.4 MG/DL
WBC # BLD AUTO: 5.5 K/UL (ref 4.6–13.2)

## 2018-09-28 PROCEDURE — 80061 LIPID PANEL: CPT | Performed by: INTERNAL MEDICINE

## 2018-09-28 PROCEDURE — 80053 COMPREHEN METABOLIC PANEL: CPT | Performed by: INTERNAL MEDICINE

## 2018-09-28 PROCEDURE — 83036 HEMOGLOBIN GLYCOSYLATED A1C: CPT | Performed by: INTERNAL MEDICINE

## 2018-09-28 PROCEDURE — 36415 COLL VENOUS BLD VENIPUNCTURE: CPT | Performed by: INTERNAL MEDICINE

## 2018-09-28 PROCEDURE — 85025 COMPLETE CBC W/AUTO DIFF WBC: CPT | Performed by: INTERNAL MEDICINE

## 2018-09-28 PROCEDURE — 82043 UR ALBUMIN QUANTITATIVE: CPT | Performed by: INTERNAL MEDICINE

## 2018-09-29 LAB
CREAT UR-MCNC: 80.6 MG/DL (ref 30–125)
MICROALBUMIN UR-MCNC: 0.4 MG/DL (ref 0–3)
MICROALBUMIN/CREAT UR-RTO: <5 MG/G (ref 0–30)

## 2018-10-01 ENCOUNTER — OFFICE VISIT (OUTPATIENT)
Dept: INTERNAL MEDICINE CLINIC | Age: 73
End: 2018-10-01

## 2018-10-01 VITALS
TEMPERATURE: 97.6 F | WEIGHT: 203 LBS | OXYGEN SATURATION: 97 % | HEART RATE: 57 BPM | SYSTOLIC BLOOD PRESSURE: 138 MMHG | HEIGHT: 73 IN | BODY MASS INDEX: 26.9 KG/M2 | DIASTOLIC BLOOD PRESSURE: 68 MMHG

## 2018-10-01 DIAGNOSIS — I10 ESSENTIAL HYPERTENSION, BENIGN: ICD-10-CM

## 2018-10-01 NOTE — MR AVS SNAPSHOT
Reagan Tavarez 
 
 
 [address], [address] [facility] [address] 
[phone] Patient: Anayeli Salazar MRN: [id number] QLL:7/8/2526 Visit Information Date & Time Provider Department Dept. Phone Encounter #  
 10/1/2018 12:30 PM Louisa Moffett MD [facility] [place] [phone] [id number] Your Appointments 1/7/2019  2:15 PM  
Follow Up with Louisa Moffett MD  
[address] [place] [address]) Appt Note: 3mo  
 [address] [facility] [address]  
  
   
 [address], [address] [facility] [address] Upcoming Health Maintenance Date Due Hepatitis C Screening 1945 Shingrix Vaccine Age 50> (1 of 2) 7/9/1996 EYE EXAM RETINAL OR DILATED Q1 3/30/2018 MEDICARE YEARLY EXAM 12/1/2018 HEMOGLOBIN A1C Q6M 3/28/2019 GLAUCOMA SCREENING Q2Y 3/30/2019 FOOT EXAM Q1 6/20/2019 MICROALBUMIN Q1 9/28/2019 LIPID PANEL Q1 9/28/2019 COLONOSCOPY 11/10/2020 DTaP/Tdap/Td series (2 - Td) 11/30/2027 Allergies as of 10/1/2018  Review Complete On: 10/1/2018 By: Ethan Ortega LPN No Known Allergies Current Immunizations  Reviewed on 9/26/2018 Name Date Influenza High Dose Vaccine PF 8/22/2018, 9/5/2017, 10/20/2016 Influenza Vaccine Sanjana Antis) 10/5/2015 Pneumococcal Conjugate (PCV-13) 10/5/2015 Pneumococcal Polysaccharide (PPSV-23) 6/14/2012 12:00 AM, 6/8/2009 Not reviewed this visit Vitals BP Pulse Temp Height(growth percentile)  
 138/68 (BP 1 Location: Left arm, BP Patient Position: Sitting) (!) 57 97.6 °F (36.4 °C) (Tympanic) 6' 1\" (1.854 m) Weight(growth percentile) SpO2 BMI Smoking Status 203 lb (92.1 kg) 97% 26.78 kg/m2 Never Smoker Vitals History BMI and BSA Data Body Mass Index Body Surface Area  
 26.78 kg/m 2 2.18 m 2 Preferred Pharmacy Pharmacy Name Phone [id number]- [address] Jose Antonio Chapman [phone] Your Updated Medication List  
  
   
This list is accurate as of 10/1/18  2:15 PM.  Always use your most recent med list.  
  
  
  
  
 aspirin delayed-release 81 mg tablet Take 81 mg by mouth daily. atorvastatin 10 mg tablet Commonly known as:  LIPITOR  
TAKE 1/2 TABLET BY MOUTH EVERY DAY  
  
 FARXIGA 10 mg Tab tablet Generic drug:  dapagliflozin TAKE 1 TAB BY MOUTH DAILY. FLUZONE HIGH-DOSE 2018-19 (PF) Syrg injection Generic drug:  influenza vaccine 2018-19 (65 yrs+)(PF)  
TO BE ADMINISTERED BY PHARMACIST FOR IMMUNIZATION  
  
 gabapentin 100 mg capsule Commonly known as:  NEURONTIN  
TAKE 1 CAPSULE BY MOUTH IN THE AM, 1 CAPSULE IN THE MID DAY, 2 CAPSULES AT BEDTIME  
  
 glimepiride 2 mg tablet Commonly known as:  AMARYL  
1/2 tablet each AM at breakfast  
  
 ibuprofen 200 mg tablet Commonly known as:  MOTRIN Take 600 mg by mouth daily as needed (headache (rare use, about monthly)). JANUVIA 100 mg tablet Generic drug:  SITagliptin TAKE 1 TABLET BY MOUTH DAILY  
  
 lactulose 10 gram/15 mL solution Commonly known as:  Cami Shine TAKE 15 ML BY MOUTH TWO (2) TIMES A DAY. metFORMIN  mg tablet Commonly known as:  GLUCOPHAGE XR  
1 tablet daily at supper  
  
 sertraline 100 mg tablet Commonly known as:  ZOLOFT  
TAKE 1 TABLET BY MOUTH DAILY  
  
 triamcinolone acetonide 0.1 % topical cream  
Commonly known as:  KENALOG Apply twice per day Introducing [facility]! [employer] introduces [employer] patient portal. Now you can access parts of your medical record, email your doctor's office, and request medication refills online. 1. In your internet browser, go to [email or web address] 2. Click on the First Time User? Click Here link in the Sign In box. You will see the New Member Sign Up page. 3. Enter your [employer] Access Code exactly as it appears below. You will not need to use this code after youve completed the sign-up process. If you do not sign up before the expiration date, you must request a new code. · [employer] Access Code: [email or web address] Expires: 12/27/2018 10:23 AM 
 
4. Enter the last four digits of your Social Security Number (xxxx) and Date of Birth (mm/dd/yyyy) as indicated and click Submit. You will be taken to the next sign-up page. 5. Create a [employer] ID. This will be your [employer] login ID and cannot be changed, so think of one that is secure and easy to remember. 6. Create a [employer] password. You can change your password at any time. 7. Enter your Password Reset Question and Answer. This can be used at a later time if you forget your password. 8. Enter your e-mail address. You will receive e-mail notification when new information is available in [address] 9. Click Sign Up. You can now view and download portions of your medical record. 10. Click the Download Summary menu link to download a portable copy of your medical information. If you have questions, please visit the Frequently Asked Questions section of the [employer] website. Remember, [employer] is NOT to be used for urgent needs. For medical emergencies, dial 911. Now available from your iPhone and Android! Please provide this summary of care documentation to your next provider. Your primary care clinician is listed as Trent Benitez. If you have any questions after today's visit, please call [phone].

## 2018-10-01 NOTE — PROGRESS NOTES
Chief Complaint Patient presents with  Well Male Depression Screening: PHQ over the last two weeks 6/21/2018 Little interest or pleasure in doing things Not at all Feeling down, depressed, irritable, or hopeless Not at all Total Score PHQ 2 0 Learning Assessment: 
Learning Assessment 6/13/2016 PRIMARY LEARNER Patient HIGHEST LEVEL OF EDUCATION - PRIMARY LEARNER  DID NOT GRADUATE HIGH SCHOOL  
BARRIERS PRIMARY LEARNER NONE  
CO-LEARNER CAREGIVER No  
CO-LEARNER NAME bia cronin CO-LEARNER HIGHEST LEVEL OF EDUCATION GRADUATED HIGH SCHOOL OR GED  
BARRIERS CO-LEARNER NONE PRIMARY LANGUAGE ENGLISH  
PRIMARY LANGUAGE CO-LEARNER ENGLISH  NEED No  
LEARNER PREFERENCE PRIMARY PICTURES  
ANSWERED BY patient RELATIONSHIP SELF Abuse Screening: 
Abuse Screening Questionnaire 6/13/2016 Do you ever feel afraid of your partner? Parry Goldberg Are you in a relationship with someone who physically or mentally threatens you? Parry Goldberg Is it safe for you to go home? Delgado Rodríguez Fall Risk Fall Risk Assessment, last 12 mths 6/21/2018 Able to walk? Yes Fall in past 12 months? No  
Number of falls in past 12 months -  
 
 
 
 
 
 
 
1. Have you been to the ER, urgent care clinic since your last visit? Hospitalized since your last visit? No 
 
2. Have you seen or consulted any other health care providers outside of the [address] [place] [address] since your last visit? Include any pap smears or colon screening.  No

## 2018-10-03 RX ORDER — GABAPENTIN 100 MG/1
CAPSULE ORAL
Qty: 360 CAP | Refills: 0 | Status: SHIPPED | OUTPATIENT
Start: 2018-10-03 | End: 2019-01-03 | Stop reason: SDUPTHER
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
## 2018-10-08 ENCOUNTER — OFFICE VISIT (OUTPATIENT)
Dept: INTERNAL MEDICINE CLINIC | Age: 73
End: 2018-10-08

## 2018-10-08 VITALS
DIASTOLIC BLOOD PRESSURE: 68 MMHG | BODY MASS INDEX: 27.04 KG/M2 | SYSTOLIC BLOOD PRESSURE: 130 MMHG | WEIGHT: 204 LBS | HEIGHT: 73 IN | OXYGEN SATURATION: 98 % | TEMPERATURE: 98.2 F | HEART RATE: 61 BPM

## 2018-10-08 DIAGNOSIS — E11.9 DIABETES MELLITUS WITHOUT COMPLICATION (HCC): ICD-10-CM

## 2018-10-08 DIAGNOSIS — R06.09 DYSPNEA ON EXERTION: ICD-10-CM

## 2018-10-08 DIAGNOSIS — I10 ESSENTIAL HYPERTENSION, BENIGN: Primary | ICD-10-CM

## 2018-10-08 NOTE — PROGRESS NOTES
Chief Complaint   Patient presents with    Breathing Problem     patient had shortness of breath        Depression Screening:  PHQ over the last two weeks 6/21/2018   Little interest or pleasure in doing things Not at all   Feeling down, depressed, irritable, or hopeless Not at all   Total Score PHQ 2 0       Learning Assessment:  Learning Assessment 6/13/2016   PRIMARY LEARNER Patient   HIGHEST LEVEL OF EDUCATION - PRIMARY LEARNER  DID NOT GRADUATE HIGH SCHOOL   BARRIERS PRIMARY LEARNER NONE   CO-LEARNER CAREGIVER No   CO-LEARNER NAME bia cronin   CO-LEARNER HIGHEST LEVEL OF EDUCATION GRADUATED HIGH SCHOOL OR GED   BARRIERS CO-LEARNER NONE   PRIMARY LANGUAGE ENGLISH   PRIMARY LANGUAGE CO-LEARNER ENGLISH    NEED No   LEARNER PREFERENCE PRIMARY PICTURES   ANSWERED BY patient   RELATIONSHIP SELF       Abuse Screening:  Abuse Screening Questionnaire 6/13/2016   Do you ever feel afraid of your partner? N   Are you in a relationship with someone who physically or mentally threatens you? N   Is it safe for you to go home? Y       Fall Risk  Fall Risk Assessment, last 12 mths 6/21/2018   Able to walk? Yes   Fall in past 12 months? No   Number of falls in past 12 months -           1. Have you been to the ER, urgent care clinic since your last visit? Hospitalized since your last visit? No    2. Have you seen or consulted any other health care providers outside of the [address] [place] [address] since your last visit? Include any pap smears or colon screening.  No

## 2018-10-10 ENCOUNTER — HOSPITAL ENCOUNTER (OUTPATIENT)
Dept: GENERAL RADIOLOGY | Age: 73
Discharge: HOME | End: 2018-10-10
Payer: MEDICARE

## 2018-10-10 DIAGNOSIS — R06.09 DYSPNEA ON EXERTION: ICD-10-CM

## 2018-10-10 PROCEDURE — 71046 X-RAY EXAM CHEST 2 VIEWS: CPT
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
## 2018-11-12 ENCOUNTER — OFFICE VISIT (OUTPATIENT)
Dept: INTERNAL MEDICINE CLINIC | Age: 73
End: 2018-11-12

## 2018-11-12 VITALS
HEART RATE: 76 BPM | OXYGEN SATURATION: 96 % | DIASTOLIC BLOOD PRESSURE: 58 MMHG | HEIGHT: 73 IN | TEMPERATURE: 97.2 F | BODY MASS INDEX: 26.24 KG/M2 | WEIGHT: 198 LBS | SYSTOLIC BLOOD PRESSURE: 130 MMHG

## 2018-11-12 DIAGNOSIS — Z00.00 MEDICARE ANNUAL WELLNESS VISIT, SUBSEQUENT: Primary | ICD-10-CM

## 2018-11-12 DIAGNOSIS — E78.2 MIXED HYPERLIPIDEMIA: ICD-10-CM

## 2018-11-12 DIAGNOSIS — E11.9 DIABETES MELLITUS WITHOUT COMPLICATION (HCC): ICD-10-CM

## 2018-11-12 DIAGNOSIS — R42 DIZZINESS: ICD-10-CM

## 2018-11-12 RX ORDER — KETOCONAZOLE 20 MG/G
CREAM TOPICAL
Refills: 2 | COMMUNITY
Start: 2018-10-23 | End: 2019-06-21 | Stop reason: ALTCHOICE

## 2018-11-12 NOTE — PROGRESS NOTES
John Benítez presents today for Chief Complaint Patient presents with  Dizziness John Benítez preferred language for health care discussion is english. Is someone accompanying this pt? Yes wife Is the patient using any DME equipment during OV? no 
 
Depression Screening: PHQ over the last two weeks 11/12/2018 Little interest or pleasure in doing things Not at all Feeling down, depressed, irritable, or hopeless Not at all Total Score PHQ 2 0 Learning Assessment: 
Learning Assessment 6/13/2016 PRIMARY LEARNER Patient HIGHEST LEVEL OF EDUCATION - PRIMARY LEARNER  DID NOT GRADUATE HIGH SCHOOL  
BARRIERS PRIMARY LEARNER NONE  
CO-LEARNER CAREGIVER No  
CO-LEARNER NAME bia cronin CO-LEARNER HIGHEST LEVEL OF EDUCATION GRADUATED HIGH SCHOOL OR GED  
BARRIERS CO-LEARNER NONE PRIMARY LANGUAGE ENGLISH  
PRIMARY LANGUAGE CO-LEARNER ENGLISH  NEED No  
LEARNER PREFERENCE PRIMARY PICTURES  
ANSWERED BY patient RELATIONSHIP SELF Abuse Screening: 
Abuse Screening Questionnaire 6/13/2016 Do you ever feel afraid of your partner? Kalyn Brandt Are you in a relationship with someone who physically or mentally threatens you? Kalyn Brandt Is it safe for you to go home? Ruslan Dyson Fall Risk Fall Risk Assessment, last 12 mths 11/12/2018 Able to walk? Yes Fall in past 12 months? No  
Number of falls in past 12 months - Health Maintenance reviewed and discussed and ordered per Provider. Health Maintenance Due Topic Date Due  
 Hepatitis C Screening  1945  Shingrix Vaccine Age 50> (1 of 2) 07/09/1996 Marekace Ramez Pt currently taking Antiplatelet therapy? Yes Aspirin Coordination of Care: 1. Have you been to the ER, urgent care clinic since your last visit? no Hospitalized since your last visit? no 
 
2.  Have you seen or consulted any other health care providers outside of the [facility] since your last visit? no Include any pap smears or colon screening.  no

## 2018-11-12 NOTE — PATIENT INSTRUCTIONS
Medicare Wellness Visit, Male The best way to improve and maintain good health is to have a healthy lifestyle by eating a well-balanced diet, exercising regularly, limiting alcohol and stopping smoking. Regular physical exams and screening tests are another way to keep healthy. Preventive exams provided by your health care provider can find health problems before they become diseases or illnesses. Preventive services including immunizations, screening tests, monitoring and exams can help you take care of your own health. Preventive services such as immunizations prevent serious infections. All people over age 72 should have a Pneumovax and a Prevnar-13 shot to prevent potentially life threatening infections with the pneumococcus bacteria, a common cause of pneumonia. These are once in a lifetime unless you and your provider decide differently. Next due: series completed All people over 65 should have a yearly influenza vaccine or \"flu\" shot. This does not prevent infection with cold viruses but has been proven to prevent hospitalization and death from influenza. Next due:  already given this season, due again next fall Although Medicare part B \"regular Medicare\" currently only covers tetanus vaccination in the context of an injury, a tetanus vaccine (Tdap or Td) is recommended every 10 years. Tdap is generally given once in a lifetime for older adults. Next due: Tdap A shingles vaccine is recommended as you get older. Zostavax is a once in a lifetime vaccine given over age 61years of age. There is also a new shingles vaccine, Shingrix, that is now preferred over Zostavax. Shingrix (a 2-dose series) is recommended if you are over age 48years of age and you've never received a shingles vaccine. It is also recommended for revaccination if you've previously received Zostavax. The Shingles vaccines are not covered by Medicare part B.  Next due: Shingrix (please check with your pharmacy on cost/availabilty) Note, however, that both the Shingles vaccine and Tdap/Td are generally covered by secondary carriers. Please check your coverage and out of pocket expenses. Your pharmacy benefits may cover these vaccines so please check with your pharmacist.  Also consider contacting your local health department because it may stock these vaccines for a reasonable charge. We currently have documentation of the following immunization history for you: 
Immunization History Administered Date(s) Administered  Influenza High Dose Vaccine PF 10/20/2016, 09/05/2017, 08/22/2018  Influenza Vaccine Candiss Lukes) 10/05/2015  Pneumococcal Conjugate (PCV-13) 10/05/2015  Pneumococcal Polysaccharide (PPSV-23) 06/08/2009, 06/14/2012 Screening for diabetes mellitus with a blood sugar test (glucose) should be done every year. If you have diabetes, this monitoring will be done more frequently (usually every 3-6 months) and will include A1C testing. The most recent blood glucose we have on file for you is:  
Lab Results Component Value Date/Time Glucose 129 (H) 09/28/2018 10:30 AM  
 Glucose (POC) 177 (H) 11/10/2015 12:54 PM  
 Glucose  10/30/2017 03:55 PM  
 
Lab Results Component Value Date/Time Hemoglobin A1c 8.0 (H) 09/28/2018 10:30 AM  
 Hemoglobin A1c, External 9.0 07/02/2015 Glaucoma is a disease of the eye due to increased ocular pressure that can lead to blindness. People with risk factors for glaucoma ( race,  American race, diabetes, family history) should be screened every year by an eye professional.  
Last done: 3/30/2018  Next due: March 2019 per Dr. Marlin Weston Cardiovascular screening tests that check for elevated lipids or cholesterol (fatty part of blood) which can lead to heart disease and strokes should be done every 5 years. The most recent lipid panel we have on file for you is:  
Lab Results Component Value Date/Time Cholesterol, total 209 (H) 09/28/2018 10:30 AM  
 HDL Cholesterol 56 09/28/2018 10:30 AM  
 LDL, calculated 131.6 (H) 09/28/2018 10:30 AM  
 VLDL, calculated 21.4 09/28/2018 10:30 AM  
 Triglyceride 107 09/28/2018 10:30 AM  
 CHOL/HDL Ratio 3.7 09/28/2018 10:30 AM  
 
Next due: annually while on atorvastatin or with diabetes Colorectal cancer screening that evaluates for blood or polyps in your colon for people with average risk should be done yearly as a stool test, every five years as a flexible sigmoidoscope or every 10 years as a colonoscopy up to age 76. You and your health care provider may decide whether to continue screening after age 76 or if you need to be screened more frequently. Last done: 11/10/2015 polyps Next due: 2020 (repeat in 5 years per Dr. Stanford You) Men up to age 76 may elect to screen for prostate cancer with a blood test called a PSA at certain intervals, depending on your personal and family history. This decision is between you and your provider. The most recent PSA values we have on file for you are: 
6/11/2015 Prostate specific antigen:2.2 No results found for: PSA, Sarahi Clark, PSAR3, [id number], [id number], PSALT Screening for infection with Hepatitis C is recommended for anyone born between [birth year] through [address]. People who are between age 54and [de-identified]years of age and have smoked the equivalent of 1 pack per day for 30 years or more may benefit from screening for lung cancer with a yearly low dose CT scan until they have been non smokers for 15 years. Please ask your health care provider if you have any questions. Your Medicare Wellness Exam is recommended annually. If you do not have Advanced Directives on file with our office and you either have the completed document at home or you fill out the forms provided, please bring a copy to the office to be scanned into your record. Here is a list of your current Health Maintenance items with a due date: 
Health Maintenance Due Topic Date Due  
 Hepatitis C Test  1945  Shingles Vaccine (1 of 2) 07/09/1996

## 2018-11-12 NOTE — ACP (ADVANCE CARE PLANNING)
Non-Provider Advance Care Planning (ACP) Note    Date of ACP Conversation: 11/12/2018  Persons included in Conversation: patient and family  Length of ACP Conversation in minutes: <16 minutes (Non-Billable)    Conversation requested by: Other: included in Medicare [address] (if patient is incapable of making informed decisions): This person is:   Other Legally Authorized Decision Maker (e.g. Next of Kin)    General ACP for ALL Patients with Decision Making Capacity:    Advance Directive Conversation with Patients who have not yet planned:  Importance of advance care planning, including choosing a healthcare agent to communicate patient's healthcare decisions if patient lost the ability to make decisions, such as after a sudden illness or accident  \"Have you thought about who you would want to make decisions about your future healthcare and treatment if you were unable to?\" still trying to determine  Recommended additional conversation with prospective agent    Interventions Provided:  Provided ACP educational materials:  Advance Directive Form  Recommended completion of Advance Directive after review of materials, conversation with prospective healthcare agent about (and others as needed), and readiness to complete a written plan  Referral to Provider for additional medical information/discussion  Recommended communicating the plan and providing copies for the healthcare agent, personal physician, and others as appropriate
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
## 2018-11-14 PROBLEM — E11.40 TYPE 2 DIABETES MELLITUS WITH DIABETIC NEUROPATHY (HCC): Status: ACTIVE | Noted: 2018-11-14
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
## 2018-11-25 RX ORDER — METFORMIN HYDROCHLORIDE 500 MG/1
TABLET, EXTENDED RELEASE ORAL
Qty: 90 TAB | Refills: 1 | Status: SHIPPED | OUTPATIENT
Start: 2018-11-25 | End: 2019-08-13 | Stop reason: ALTCHOICE

## 2018-12-10 DIAGNOSIS — K59.00 CONSTIPATION, UNSPECIFIED CONSTIPATION TYPE: ICD-10-CM

## 2018-12-11 RX ORDER — LACTULOSE 10 G/15ML
SOLUTION ORAL; RECTAL
Qty: 473 ML | Refills: 0 | Status: SHIPPED | OUTPATIENT
Start: 2018-12-11 | End: 2019-06-21 | Stop reason: ALTCHOICE

## 2018-12-11 RX ORDER — SERTRALINE HYDROCHLORIDE 100 MG/1
TABLET, FILM COATED ORAL
Qty: 90 TAB | Refills: 0 | Status: SHIPPED | OUTPATIENT
Start: 2018-12-11 | End: 2019-03-22 | Stop reason: SDUPTHER

## 2018-12-12 NOTE — TELEPHONE ENCOUNTER
Requested Prescriptions     Pending Prescriptions Disp Refills    lactulose (CHRONULAC) 10 gram/15 mL solution 1419 mL 2     Sig: Take 15 mL by mouth daily as needed (constipation).      90 day requested due to insurance

## 2018-12-20 RX ORDER — RAMIPRIL 10 MG/1
CAPSULE ORAL
Qty: 90 CAP | Refills: 0 | Status: SHIPPED | OUTPATIENT
Start: 2018-12-20 | End: 2019-03-20 | Stop reason: SDUPTHER

## 2019-01-02 ENCOUNTER — HOSPITAL ENCOUNTER (OUTPATIENT)
Dept: LAB | Age: 74
Discharge: HOME | End: 2019-01-02
Payer: MEDICARE

## 2019-01-02 DIAGNOSIS — I10 ESSENTIAL HYPERTENSION, BENIGN: ICD-10-CM

## 2019-01-02 DIAGNOSIS — E11.9 CONTROLLED TYPE 2 DIABETES MELLITUS WITHOUT COMPLICATION, WITH LONG-TERM CURRENT USE OF INSULIN (HCC): ICD-10-CM

## 2019-01-02 DIAGNOSIS — Z79.4 CONTROLLED TYPE 2 DIABETES MELLITUS WITHOUT COMPLICATION, WITH LONG-TERM CURRENT USE OF INSULIN (HCC): ICD-10-CM

## 2019-01-02 DIAGNOSIS — E78.2 MIXED HYPERLIPIDEMIA: ICD-10-CM

## 2019-01-02 LAB
ALBUMIN SERPL-MCNC: 3.7 G/DL (ref 3.4–5)
ALBUMIN/GLOB SERPL: 1.1 {RATIO} (ref 0.8–1.7)
ALP SERPL-CCNC: 86 U/L (ref 45–117)
ALT SERPL-CCNC: 25 U/L (ref 16–61)
ANION GAP SERPL CALC-SCNC: 5 MMOL/L (ref 3–18)
AST SERPL-CCNC: 16 U/L (ref 15–37)
BASOPHILS # BLD: 0 K/UL (ref 0–0.1)
BASOPHILS NFR BLD: 0 % (ref 0–2)
BILIRUB SERPL-MCNC: 0.6 MG/DL (ref 0.2–1)
BUN SERPL-MCNC: 19 MG/DL (ref 7–18)
BUN/CREAT SERPL: 27 (ref 12–20)
CALCIUM SERPL-MCNC: 8.6 MG/DL (ref 8.5–10.1)
CHLORIDE SERPL-SCNC: 105 MMOL/L (ref 100–108)
CHOLEST SERPL-MCNC: 210 MG/DL
CO2 SERPL-SCNC: 29 MMOL/L (ref 21–32)
CREAT SERPL-MCNC: 0.71 MG/DL (ref 0.6–1.3)
DIFFERENTIAL METHOD BLD: ABNORMAL
EOSINOPHIL # BLD: 0.3 K/UL (ref 0–0.4)
EOSINOPHIL NFR BLD: 5 % (ref 0–5)
ERYTHROCYTE [DISTWIDTH] IN BLOOD BY AUTOMATED COUNT: 14.2 % (ref 11.6–14.5)
EST. AVERAGE GLUCOSE BLD GHB EST-MCNC: 220 MG/DL
GLOBULIN SER CALC-MCNC: 3.3 G/DL (ref 2–4)
GLUCOSE SERPL-MCNC: 184 MG/DL (ref 74–99)
HBA1C MFR BLD: 9.3 % (ref 4.2–5.6)
HCT VFR BLD AUTO: 41.3 % (ref 36–48)
HDLC SERPL-MCNC: 57 MG/DL (ref 40–60)
HDLC SERPL: 3.7 {RATIO} (ref 0–5)
HGB BLD-MCNC: 13.5 G/DL (ref 13–16)
LDLC SERPL CALC-MCNC: 123.8 MG/DL (ref 0–100)
LIPID PROFILE,FLP: ABNORMAL
LYMPHOCYTES # BLD: 1.9 K/UL (ref 0.9–3.6)
LYMPHOCYTES NFR BLD: 26 % (ref 21–52)
MCH RBC QN AUTO: 29.5 PG (ref 24–34)
MCHC RBC AUTO-ENTMCNC: 32.7 G/DL (ref 31–37)
MCV RBC AUTO: 90.4 FL (ref 74–97)
MONOCYTES # BLD: 0.7 K/UL (ref 0.05–1.2)
MONOCYTES NFR BLD: 10 % (ref 3–10)
NEUTS SEG # BLD: 4.3 K/UL (ref 1.8–8)
NEUTS SEG NFR BLD: 59 % (ref 40–73)
PLATELET # BLD AUTO: 223 K/UL (ref 135–420)
PMV BLD AUTO: 10.1 FL (ref 9.2–11.8)
POTASSIUM SERPL-SCNC: 4.4 MMOL/L (ref 3.5–5.5)
PROT SERPL-MCNC: 7 G/DL (ref 6.4–8.2)
RBC # BLD AUTO: 4.57 M/UL (ref 4.7–5.5)
SODIUM SERPL-SCNC: 139 MMOL/L (ref 136–145)
TRIGL SERPL-MCNC: 146 MG/DL (ref ?–150)
VLDLC SERPL CALC-MCNC: 29.2 MG/DL
WBC # BLD AUTO: 7.2 K/UL (ref 4.6–13.2)

## 2019-01-02 PROCEDURE — 85025 COMPLETE CBC W/AUTO DIFF WBC: CPT

## 2019-01-02 PROCEDURE — 80053 COMPREHEN METABOLIC PANEL: CPT

## 2019-01-02 PROCEDURE — 83036 HEMOGLOBIN GLYCOSYLATED A1C: CPT

## 2019-01-02 PROCEDURE — 36415 COLL VENOUS BLD VENIPUNCTURE: CPT

## 2019-01-02 PROCEDURE — 80061 LIPID PANEL: CPT

## 2019-01-03 RX ORDER — GABAPENTIN 100 MG/1
CAPSULE ORAL
Qty: 360 CAP | Refills: 0 | Status: SHIPPED | OUTPATIENT
Start: 2019-01-03 | End: 2019-04-09 | Stop reason: SDUPTHER

## 2019-01-07 ENCOUNTER — OFFICE VISIT (OUTPATIENT)
Dept: INTERNAL MEDICINE CLINIC | Age: 74
End: 2019-01-07

## 2019-01-07 VITALS
WEIGHT: 199 LBS | OXYGEN SATURATION: 96 % | BODY MASS INDEX: 26.37 KG/M2 | SYSTOLIC BLOOD PRESSURE: 122 MMHG | TEMPERATURE: 97.3 F | HEIGHT: 73 IN | DIASTOLIC BLOOD PRESSURE: 60 MMHG | HEART RATE: 66 BPM

## 2019-01-07 DIAGNOSIS — I10 ESSENTIAL HYPERTENSION, BENIGN: ICD-10-CM

## 2019-01-07 NOTE — PROGRESS NOTES
Beryle Alstrom presents today for Chief Complaint Patient presents with  Well Male Beryle Alstrom preferred language for health care discussion is english. Is someone accompanying this pt? no 
 
Is the patient using any DME equipment during OV? no 
 
Depression Screening: PHQ over the last two weeks 1/7/2019 Little interest or pleasure in doing things Several days Feeling down, depressed, irritable, or hopeless Several days Total Score PHQ 2 2 Learning Assessment: 
Learning Assessment 6/13/2016 PRIMARY LEARNER Patient HIGHEST LEVEL OF EDUCATION - PRIMARY LEARNER  DID NOT GRADUATE HIGH SCHOOL  
BARRIERS PRIMARY LEARNER NONE  
CO-LEARNER CAREGIVER No  
CO-LEARNER NAME bia cronin CO-LEARNER HIGHEST LEVEL OF EDUCATION GRADUATED HIGH SCHOOL OR GED  
BARRIERS CO-LEARNER NONE PRIMARY LANGUAGE ENGLISH  
PRIMARY LANGUAGE CO-LEARNER ENGLISH  NEED No  
LEARNER PREFERENCE PRIMARY PICTURES  
ANSWERED BY patient RELATIONSHIP SELF Abuse Screening: 
Abuse Screening Questionnaire 11/12/2018 Do you ever feel afraid of your partner? Nicol Crafts Are you in a relationship with someone who physically or mentally threatens you? Nicol CraNevigo Is it safe for you to go home? Tanvir Barajas Fall Risk Fall Risk Assessment, last 12 mths 1/7/2019 Able to walk? Yes Fall in past 12 months? Yes Fall with injury? Yes  
Number of falls in past 12 months 1 Fall Risk Score 2 Health Maintenance reviewed and discussed and ordered per Provider. Health Maintenance Due Topic Date Due  
 Hepatitis C Screening  1945  Shingrix Vaccine Age 50> (1 of 2) 07/09/1996 Artist Hazy Beryle Alstrom is updated on all  Pt currently taking Antiplatelet therapy? Yes aspirin Coordination of Care: 1. Have you been to the ER, urgent care clinic since your last visit? no Hospitalized since your last visit? no 
 
2.  Have you seen or consulted any other health care providers outside of the [address] [place] [address] since your last visit? no Include any pap smears or colon screening.  no

## 2019-01-09 ENCOUNTER — TELEPHONE (OUTPATIENT)
Dept: INTERNAL MEDICINE CLINIC | Age: 74
End: 2019-01-09
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
## 2019-01-21 ENCOUNTER — HOSPITAL ENCOUNTER (OUTPATIENT)
Dept: LAB | Age: 74
Discharge: HOME | End: 2019-01-21
Payer: MEDICARE

## 2019-01-21 DIAGNOSIS — E11.9 CONTROLLED TYPE 2 DIABETES MELLITUS WITHOUT COMPLICATION, WITH LONG-TERM CURRENT USE OF INSULIN (HCC): ICD-10-CM

## 2019-01-21 DIAGNOSIS — Z79.4 CONTROLLED TYPE 2 DIABETES MELLITUS WITHOUT COMPLICATION, WITH LONG-TERM CURRENT USE OF INSULIN (HCC): ICD-10-CM

## 2019-01-21 LAB
ANION GAP SERPL CALC-SCNC: 4 MMOL/L (ref 3–18)
BUN SERPL-MCNC: 22 MG/DL (ref 7–18)
BUN/CREAT SERPL: 29 (ref 12–20)
CALCIUM SERPL-MCNC: 8.8 MG/DL (ref 8.5–10.1)
CHLORIDE SERPL-SCNC: 103 MMOL/L (ref 100–108)
CO2 SERPL-SCNC: 32 MMOL/L (ref 21–32)
CREAT SERPL-MCNC: 0.75 MG/DL (ref 0.6–1.3)
GLUCOSE SERPL-MCNC: 147 MG/DL (ref 74–99)
POTASSIUM SERPL-SCNC: 5 MMOL/L (ref 3.5–5.5)
SODIUM SERPL-SCNC: 139 MMOL/L (ref 136–145)

## 2019-01-21 PROCEDURE — 80048 BASIC METABOLIC PNL TOTAL CA: CPT

## 2019-01-21 PROCEDURE — 36415 COLL VENOUS BLD VENIPUNCTURE: CPT

## 2019-01-31 RX ORDER — DAPAGLIFLOZIN 10 MG/1
TABLET, FILM COATED ORAL
Qty: 90 TAB | Refills: 1 | Status: SHIPPED | OUTPATIENT
Start: 2019-01-31 | End: 2019-07-20 | Stop reason: SDUPTHER

## 2019-03-04 RX ORDER — GLIMEPIRIDE 2 MG/1
TABLET ORAL
Qty: 30 TAB | Refills: 3 | Status: SHIPPED | OUTPATIENT
Start: 2019-03-04 | End: 2019-06-21 | Stop reason: ALTCHOICE

## 2019-03-20 RX ORDER — RAMIPRIL 10 MG/1
CAPSULE ORAL
Qty: 90 CAP | Refills: 0 | Status: SHIPPED | OUTPATIENT
Start: 2019-03-20 | End: 2019-06-15 | Stop reason: SDUPTHER

## 2019-03-22 RX ORDER — SERTRALINE HYDROCHLORIDE 100 MG/1
TABLET, FILM COATED ORAL
Qty: 90 TAB | Refills: 0 | Status: SHIPPED | OUTPATIENT
Start: 2019-03-22 | End: 2019-06-13 | Stop reason: SDUPTHER

## 2019-03-22 NOTE — TELEPHONE ENCOUNTER
Requested Prescriptions     Pending Prescriptions Disp Refills    sertraline (ZOLOFT) 100 mg tablet [Pharmacy Med Name: SERTRALINE  MG TABLET] 90 Tab 0     Sig: TAKE 1 TABLET BY MOUTH DAILY

## 2019-04-09 RX ORDER — GABAPENTIN 100 MG/1
CAPSULE ORAL
Qty: 360 CAP | Refills: 0 | Status: SHIPPED | OUTPATIENT
Start: 2019-04-09 | End: 2019-07-01 | Stop reason: SDUPTHER

## 2019-06-11 ENCOUNTER — HOSPITAL ENCOUNTER (OUTPATIENT)
Dept: LAB | Age: 74
Discharge: HOME | End: 2019-06-11
Payer: MEDICARE

## 2019-06-11 DIAGNOSIS — E78.2 MIXED HYPERLIPIDEMIA: ICD-10-CM

## 2019-06-11 DIAGNOSIS — Z79.4 TYPE 2 DIABETES MELLITUS WITH DIABETIC NEUROPATHY, WITH LONG-TERM CURRENT USE OF INSULIN (HCC): ICD-10-CM

## 2019-06-11 DIAGNOSIS — E11.40 TYPE 2 DIABETES MELLITUS WITH DIABETIC NEUROPATHY, WITH LONG-TERM CURRENT USE OF INSULIN (HCC): ICD-10-CM

## 2019-06-11 DIAGNOSIS — Z12.5 PROSTATE CANCER SCREENING: ICD-10-CM

## 2019-06-11 DIAGNOSIS — I10 ESSENTIAL HYPERTENSION, BENIGN: ICD-10-CM

## 2019-06-11 LAB
ALBUMIN SERPL-MCNC: 4 G/DL (ref 3.4–5)
ALBUMIN/GLOB SERPL: 1.3 {RATIO} (ref 0.8–1.7)
ALP SERPL-CCNC: 87 U/L (ref 45–117)
ALT SERPL-CCNC: 26 U/L (ref 16–61)
ANION GAP SERPL CALC-SCNC: 6 MMOL/L (ref 3–18)
AST SERPL-CCNC: 15 U/L (ref 15–37)
BASOPHILS # BLD: 0 K/UL (ref 0–0.1)
BASOPHILS NFR BLD: 1 % (ref 0–2)
BILIRUB SERPL-MCNC: 0.5 MG/DL (ref 0.2–1)
BUN SERPL-MCNC: 21 MG/DL (ref 7–18)
BUN/CREAT SERPL: 27 (ref 12–20)
CALCIUM SERPL-MCNC: 9 MG/DL (ref 8.5–10.1)
CHLORIDE SERPL-SCNC: 101 MMOL/L (ref 100–108)
CHOLEST SERPL-MCNC: 211 MG/DL
CO2 SERPL-SCNC: 31 MMOL/L (ref 21–32)
CREAT SERPL-MCNC: 0.77 MG/DL (ref 0.6–1.3)
DIFFERENTIAL METHOD BLD: ABNORMAL
EOSINOPHIL # BLD: 0.3 K/UL (ref 0–0.4)
EOSINOPHIL NFR BLD: 4 % (ref 0–5)
ERYTHROCYTE [DISTWIDTH] IN BLOOD BY AUTOMATED COUNT: 14.3 % (ref 11.6–14.5)
EST. AVERAGE GLUCOSE BLD GHB EST-MCNC: 186 MG/DL
GLOBULIN SER CALC-MCNC: 3 G/DL (ref 2–4)
GLUCOSE SERPL-MCNC: 189 MG/DL (ref 74–99)
HBA1C MFR BLD: 8.1 % (ref 4.2–5.6)
HCT VFR BLD AUTO: 41.7 % (ref 36–48)
HDLC SERPL-MCNC: 60 MG/DL (ref 40–60)
HDLC SERPL: 3.5 {RATIO} (ref 0–5)
HGB BLD-MCNC: 13.3 G/DL (ref 13–16)
LDLC SERPL CALC-MCNC: 130.8 MG/DL (ref 0–100)
LIPID PROFILE,FLP: ABNORMAL
LYMPHOCYTES # BLD: 2.2 K/UL (ref 0.9–3.6)
LYMPHOCYTES NFR BLD: 34 % (ref 21–52)
MCH RBC QN AUTO: 29.2 PG (ref 24–34)
MCHC RBC AUTO-ENTMCNC: 31.9 G/DL (ref 31–37)
MCV RBC AUTO: 91.6 FL (ref 74–97)
MONOCYTES # BLD: 0.7 K/UL (ref 0.05–1.2)
MONOCYTES NFR BLD: 10 % (ref 3–10)
NEUTS SEG # BLD: 3.3 K/UL (ref 1.8–8)
NEUTS SEG NFR BLD: 51 % (ref 40–73)
PLATELET # BLD AUTO: 207 K/UL (ref 135–420)
PMV BLD AUTO: 9.8 FL (ref 9.2–11.8)
POTASSIUM SERPL-SCNC: 4.6 MMOL/L (ref 3.5–5.5)
PROT SERPL-MCNC: 7 G/DL (ref 6.4–8.2)
RBC # BLD AUTO: 4.55 M/UL (ref 4.7–5.5)
SODIUM SERPL-SCNC: 138 MMOL/L (ref 136–145)
TRIGL SERPL-MCNC: 101 MG/DL (ref ?–150)
VLDLC SERPL CALC-MCNC: 20.2 MG/DL
WBC # BLD AUTO: 6.5 K/UL (ref 4.6–13.2)

## 2019-06-11 PROCEDURE — 83036 HEMOGLOBIN GLYCOSYLATED A1C: CPT

## 2019-06-11 PROCEDURE — 84153 ASSAY OF PSA TOTAL: CPT

## 2019-06-11 PROCEDURE — 80053 COMPREHEN METABOLIC PANEL: CPT

## 2019-06-11 PROCEDURE — 80061 LIPID PANEL: CPT

## 2019-06-11 PROCEDURE — 85025 COMPLETE CBC W/AUTO DIFF WBC: CPT

## 2019-06-11 PROCEDURE — 36415 COLL VENOUS BLD VENIPUNCTURE: CPT

## 2019-06-12 LAB — PSA SERPL-MCNC: 1 NG/ML (ref 0–4)

## 2019-06-13 RX ORDER — SERTRALINE HYDROCHLORIDE 100 MG/1
TABLET, FILM COATED ORAL
Qty: 90 TAB | Refills: 0 | Status: SHIPPED | OUTPATIENT
Start: 2019-06-13 | End: 2019-09-13 | Stop reason: SDUPTHER

## 2019-06-16 RX ORDER — RAMIPRIL 10 MG/1
CAPSULE ORAL
Qty: 90 CAP | Refills: 0 | Status: SHIPPED | OUTPATIENT
Start: 2019-06-16 | End: 2019-06-18 | Stop reason: ALTCHOICE

## 2019-06-18 ENCOUNTER — OFFICE VISIT (OUTPATIENT)
Dept: FAMILY MEDICINE CLINIC | Facility: CLINIC | Age: 74
End: 2019-06-18

## 2019-06-18 VITALS
BODY MASS INDEX: 24.86 KG/M2 | DIASTOLIC BLOOD PRESSURE: 60 MMHG | WEIGHT: 187.6 LBS | HEART RATE: 103 BPM | OXYGEN SATURATION: 99 % | TEMPERATURE: 97.3 F | HEIGHT: 73 IN | RESPIRATION RATE: 12 BRPM | SYSTOLIC BLOOD PRESSURE: 116 MMHG

## 2019-06-18 DIAGNOSIS — E11.65 UNCONTROLLED TYPE 2 DIABETES MELLITUS WITH HYPERGLYCEMIA (HCC): Primary | ICD-10-CM

## 2019-06-18 DIAGNOSIS — I10 ESSENTIAL HYPERTENSION, BENIGN: ICD-10-CM

## 2019-06-18 RX ORDER — PIOGLITAZONEHYDROCHLORIDE 15 MG/1
TABLET ORAL
Refills: 3 | COMMUNITY
Start: 2019-04-22

## 2019-06-18 RX ORDER — RAMIPRIL 5 MG/1
CAPSULE ORAL
Qty: 30 CAP | Refills: 3 | Status: SHIPPED | OUTPATIENT
Start: 2019-06-18 | End: 2019-09-12 | Stop reason: SDUPTHER

## 2019-06-18 RX ORDER — ROSUVASTATIN CALCIUM 5 MG/1
5 TABLET, COATED ORAL
Qty: 30 TAB | Refills: 2 | Status: SHIPPED | OUTPATIENT
Start: 2019-06-18 | End: 2019-09-13 | Stop reason: SDUPTHER

## 2019-06-18 RX ORDER — DULAGLUTIDE 1.5 MG/.5ML
INJECTION, SOLUTION SUBCUTANEOUS
Refills: 3 | COMMUNITY
Start: 2019-04-30

## 2019-06-18 NOTE — PROGRESS NOTES
ROOM # 5    Helio Colón presents today for   Chief Complaint   Patient presents with    Diabetes       Helio Colón preferred language for health care discussion is english/other. Is someone accompanying this pt? no    Is the patient using any DME equipment during OV? no    Depression Screening:  3 most recent PHQ Screens 6/18/2019 1/7/2019 1/7/2019 11/12/2018 6/21/2018 10/30/2017 10/20/2016   Little interest or pleasure in doing things Not at all Several days Not at all Not at all Not at all Not at all Not at all   Feeling down, depressed, irritable, or hopeless Not at all Several days Not at all Not at all Not at all Not at all Not at all   Total Score PHQ 2 0 2 0 0 0 0 0       Learning Assessment:  Learning Assessment 6/13/2016   PRIMARY LEARNER Patient   HIGHEST LEVEL OF EDUCATION - PRIMARY LEARNER  DID NOT GRADUATE [facility] PRIMARY LEARNER NONE   CO-LEARNER CAREGIVER No   CO-LEARNER NAME bia cronin   CO-LEARNER HIGHEST LEVEL OF EDUCATION GRADUATED HIGH SCHOOL OR GED   [facility]    NEED No   LEARNER PREFERENCE PRIMARY PICTURES   ANSWERED BY patient   RELATIONSHIP SELF       Abuse Screening:  Abuse Screening Questionnaire 11/12/2018 6/13/2016   Do you ever feel afraid of your partner? N N   Are you in a relationship with someone who physically or mentally threatens you? N N   Is it safe for you to go home? Y Y       Fall Risk  Fall Risk Assessment, last 12 mths 6/18/2019 1/7/2019 1/7/2019 11/12/2018 6/21/2018 10/30/2017 10/20/2016   Able to walk? Yes Yes Yes Yes Yes Yes Yes   Fall in past 12 months? Yes Yes No No No Yes No   Fall with injury? No Yes - - - - -   Number of falls in past 12 months 3 1 - - - 2 -   Fall Risk Score 3 2 - - - - -       Health Maintenance reviewed and discussed per provider.  Yes    Helio Colón is due for   Health Maintenance Due   Topic Date Due    Hepatitis C Screening 1945    Shingrix Vaccine Age 50> (1 of 2) 07/09/1996         Please order/place referral if appropriate. Advance Directive:  1. Do you have an advance directive in place? Patient Reply: no    2. If not, would you like material regarding how to put one in place? Patient Reply: no    Coordination of Care:  1. Have you been to the ER, urgent care clinic since your last visit? Hospitalized since your last visit? no    2. Have you seen or consulted any other health care providers outside of the [address] [place] [address] since your last visit? Include any pap smears or colon screening.  no
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
## 2019-07-01 DIAGNOSIS — Z79.4 CONTROLLED TYPE 2 DIABETES MELLITUS WITHOUT COMPLICATION, WITH LONG-TERM CURRENT USE OF INSULIN (HCC): Primary | ICD-10-CM

## 2019-07-01 DIAGNOSIS — E11.9 CONTROLLED TYPE 2 DIABETES MELLITUS WITHOUT COMPLICATION, WITH LONG-TERM CURRENT USE OF INSULIN (HCC): Primary | ICD-10-CM

## 2019-07-01 RX ORDER — GABAPENTIN 100 MG/1
CAPSULE ORAL
Qty: 360 CAP | Refills: 0 | Status: SHIPPED | OUTPATIENT
Start: 2019-07-01 | End: 2019-08-13 | Stop reason: ALTCHOICE

## 2019-07-20 RX ORDER — DAPAGLIFLOZIN 10 MG/1
TABLET, FILM COATED ORAL
Qty: 90 TAB | Refills: 1 | Status: SHIPPED | OUTPATIENT
Start: 2019-07-20

## 2019-08-05 ENCOUNTER — HOSPITAL ENCOUNTER (EMERGENCY)
Age: 74
Discharge: HOME | End: 2019-08-05
Attending: EMERGENCY MEDICINE
Payer: MEDICARE

## 2019-08-05 ENCOUNTER — APPOINTMENT (OUTPATIENT)
Dept: GENERAL RADIOLOGY | Age: 74
End: 2019-08-05
Attending: EMERGENCY MEDICINE
Payer: MEDICARE

## 2019-08-05 VITALS
SYSTOLIC BLOOD PRESSURE: 132 MMHG | OXYGEN SATURATION: 98 % | DIASTOLIC BLOOD PRESSURE: 81 MMHG | BODY MASS INDEX: 23.04 KG/M2 | RESPIRATION RATE: 20 BRPM | HEART RATE: 72 BPM | HEIGHT: 74 IN | TEMPERATURE: 97.6 F | WEIGHT: 179.5 LBS

## 2019-08-05 DIAGNOSIS — M25.552 ACUTE HIP PAIN, LEFT: Primary | ICD-10-CM

## 2019-08-05 PROCEDURE — 96372 THER/PROPH/DIAG INJ SC/IM: CPT

## 2019-08-05 PROCEDURE — 99282 EMERGENCY DEPT VISIT SF MDM: CPT

## 2019-08-05 PROCEDURE — 74011250636 HC RX REV CODE- 250/636: Performed by: EMERGENCY MEDICINE

## 2019-08-05 PROCEDURE — 72100 X-RAY EXAM L-S SPINE 2/3 VWS: CPT

## 2019-08-05 RX ORDER — OXYCODONE AND ACETAMINOPHEN 5; 325 MG/1; MG/1
1 TABLET ORAL
Qty: 12 TAB | Refills: 0 | Status: SHIPPED | OUTPATIENT
Start: 2019-08-05 | End: 2019-08-08 | Stop reason: ALTCHOICE

## 2019-08-05 RX ORDER — FENTANYL CITRATE 50 UG/ML
50 INJECTION, SOLUTION INTRAMUSCULAR; INTRAVENOUS
Status: COMPLETED | OUTPATIENT
Start: 2019-08-05 | End: 2019-08-05

## 2019-08-05 RX ADMIN — FENTANYL CITRATE 50 MCG: 50 INJECTION INTRAMUSCULAR; INTRAVENOUS at 13:21

## 2019-08-05 NOTE — ED TRIAGE NOTES
Patient states starting a trial drug on July 29th for numbness and tingling that has been residual since CVA. He states picking up back end of go-cart yesterday prior to onset of left hip pain. States being treated by chiropractor on Friday and earlier today. States improvement after massage therapy today.

## 2019-08-05 NOTE — DISCHARGE INSTRUCTIONS
Patient Education        Hip Pain: Care Instructions  Your Care Instructions    Hip pain may be caused by many things, including overuse, a fall, or a twisting movement. Another cause of hip pain is arthritis. Your pain may increase when you stand up, walk, or squat. The pain may come and go or may be constant. Home treatment can help relieve hip pain, swelling, and stiffness. If your pain is ongoing, you may need more tests and treatment. Follow-up care is a key part of your treatment and safety. Be sure to make and go to all appointments, and call your doctor if you are having problems. It's also a good idea to know your test results and keep a list of the medicines you take. How can you care for yourself at home? · Take pain medicines exactly as directed. ? If the doctor gave you a prescription medicine for pain, take it as prescribed. ? If you are not taking a prescription pain medicine, ask your doctor if you can take an over-the-counter medicine. · Rest and protect your hip. Take a break from any activity, including standing or walking, that may cause pain. · Put ice or a cold pack against your hip for 10 to 20 minutes at a time. Try to do this every 1 to 2 hours for the next 3 days (when you are awake) or until the swelling goes down. Put a thin cloth between the ice and your skin. · Sleep on your healthy side with a pillow between your knees, or sleep on your back with pillows under your knees. · If there is no swelling, you can put moist heat, a heating pad, or a warm cloth on your hip. Do gentle stretching exercises to help keep your hip flexible. · Learn how to prevent falls. Have your vision and hearing checked regularly. Wear slippers or shoes with a nonskid sole. · Stay at a healthy weight. · Wear comfortable shoes. When should you call for help? Call 911 anytime you think you may need emergency care.  For example, call if:    · You have sudden chest pain and shortness of breath, or you cough up blood.     · You are not able to stand or walk or bear weight.     · Your buttocks, legs, or feet feel numb or tingly.     · Your leg or foot is cool or pale or changes color.     · You have severe pain.    Call your doctor now or seek immediate medical care if:    · You have signs of infection, such as:  ? Increased pain, swelling, warmth, or redness in the hip area. ? Red streaks leading from the hip area. ? Pus draining from the hip area. ? A fever.     · You have signs of a blood clot, such as:  ? Pain in your calf, back of the knee, thigh, or groin. ? Redness and swelling in your leg or groin.     · You are not able to bend, straighten, or move your leg normally.     · You have trouble urinating or having bowel movements.    Watch closely for changes in your health, and be sure to contact your doctor if:    · You do not get better as expected. Where can you learn more? Go to http://ofelia-mehul.info/. Enter E576 in the search box to learn more about \"Hip Pain: Care Instructions. \"  Current as of: September 23, 2018  Content Version: 12.1  © 5808-2388 [employer]. Care instructions adapted under license by [employer] (which disclaims liability or warranty for this information). If you have questions about a medical condition or this instruction, always ask your healthcare professional. [place] [address] any warranty or liability for your use of this information.

## 2019-08-05 NOTE — ED NOTES
Current Discharge Medication List      START taking these medications    Details   oxyCODONE-acetaminophen (PERCOCET) 5-325 mg per tablet Take 1 Tab by mouth every four (4) hours as needed for Pain for up to 3 days. Max Daily Amount: 6 Tabs. Take 1 tablet every 4-6 hours as needed for pain control. Qty: 12 Tab, Refills: 0    Associated Diagnoses: Acute hip pain, left         Patient armband removed and shredded. Prescription given and reviewed with patient.
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
## 2019-08-08 ENCOUNTER — TELEPHONE (OUTPATIENT)
Dept: FAMILY MEDICINE CLINIC | Facility: CLINIC | Age: 74
End: 2019-08-08

## 2019-08-08 DIAGNOSIS — M54.41 ACUTE RIGHT-SIDED LOW BACK PAIN WITH RIGHT-SIDED SCIATICA: Primary | ICD-10-CM

## 2019-08-08 RX ORDER — PREDNISONE 20 MG/1
TABLET ORAL
Qty: 20 TAB | Refills: 0 | Status: SHIPPED | OUTPATIENT
Start: 2019-08-08 | End: 2019-12-02 | Stop reason: ALTCHOICE

## 2019-08-08 RX ORDER — GLIMEPIRIDE 2 MG/1
TABLET ORAL
Qty: 60 TAB | Refills: 1 | Status: SHIPPED | OUTPATIENT
Start: 2019-08-08 | End: 2019-08-30 | Stop reason: SDUPTHER

## 2019-08-08 RX ORDER — OXYCODONE AND ACETAMINOPHEN 7.5; 325 MG/1; MG/1
1 TABLET ORAL
Qty: 28 TAB | Refills: 0 | Status: SHIPPED | OUTPATIENT
Start: 2019-08-08 | End: 2019-08-25 | Stop reason: SDUPTHER

## 2019-08-13 ENCOUNTER — OFFICE VISIT (OUTPATIENT)
Dept: FAMILY MEDICINE CLINIC | Facility: CLINIC | Age: 74
End: 2019-08-13

## 2019-08-13 VITALS
HEIGHT: 73 IN | OXYGEN SATURATION: 96 % | DIASTOLIC BLOOD PRESSURE: 70 MMHG | RESPIRATION RATE: 14 BRPM | SYSTOLIC BLOOD PRESSURE: 130 MMHG | TEMPERATURE: 98.5 F | BODY MASS INDEX: 22.93 KG/M2 | HEART RATE: 82 BPM | WEIGHT: 173 LBS

## 2019-08-13 DIAGNOSIS — I10 ESSENTIAL HYPERTENSION, BENIGN: ICD-10-CM

## 2019-08-13 DIAGNOSIS — E11.9 CONTROLLED TYPE 2 DIABETES MELLITUS WITHOUT COMPLICATION, WITHOUT LONG-TERM CURRENT USE OF INSULIN (HCC): ICD-10-CM

## 2019-08-13 DIAGNOSIS — M54.32 SCIATICA OF LEFT SIDE: Primary | ICD-10-CM

## 2019-08-13 RX ORDER — GABAPENTIN 300 MG/1
CAPSULE ORAL
Qty: 90 CAP | Refills: 2 | Status: SHIPPED | OUTPATIENT
Start: 2019-08-13 | End: 2019-08-13 | Stop reason: SDUPTHER

## 2019-08-13 RX ORDER — METFORMIN HYDROCHLORIDE 1000 MG/1
1000 TABLET ORAL 2 TIMES DAILY WITH MEALS
COMMUNITY

## 2019-08-13 RX ORDER — GABAPENTIN 300 MG/1
CAPSULE ORAL
Qty: 270 CAP | Refills: 2 | Status: SHIPPED | OUTPATIENT
Start: 2019-08-13

## 2019-08-13 NOTE — PROGRESS NOTES
Jace Mejia presents today for   Chief Complaint   Patient presents with    Hip Pain     left leg       Jace Mejia preferred language for health care discussion is english. Is someone accompanying this pt? Yes daughter    Is the patient using any DME equipment during OV? Knee wheel    Depression Screening:  3 most recent PHQ Screens 8/13/2019   Little interest or pleasure in doing things Not at all   Feeling down, depressed, irritable, or hopeless Not at all   Total Score PHQ 2 0       Learning Assessment:  Learning Assessment 6/13/2016   PRIMARY LEARNER Patient   HIGHEST LEVEL OF EDUCATION - PRIMARY LEARNER  DID NOT GRADUATE HIGH SCHOOL   BARRIERS PRIMARY LEARNER NONE   CO-LEARNER CAREGIVER No   CO-LEARNER NAME bia cronin   CO-LEARNER HIGHEST LEVEL OF EDUCATION GRADUATED HIGH SCHOOL OR GED   BARRIERS CO-LEARNER NONE   PRIMARY LANGUAGE ENGLISH   PRIMARY LANGUAGE [address] [facility]    NEED No   LEARNER PREFERENCE PRIMARY PICTURES   ANSWERED BY patient   RELATIONSHIP SELF       Abuse Screening:  Abuse Screening Questionnaire 11/12/2018   Do you ever feel afraid of your partner? N   Are you in a relationship with someone who physically or mentally threatens you? N   Is it safe for you to go home? Y       Fall Risk  Fall Risk Assessment, last 12 mths 8/13/2019   Able to walk? Yes   Fall in past 12 months? No   Fall with injury? -   Number of falls in past 12 months -   Fall Risk Score -       Health Maintenance reviewed and discussed and ordered per Provider. Health Maintenance Due   Topic Date Due    Hepatitis C Screening  1945    Shingrix Vaccine Age 50> (1 of 2) 07/09/1996    Influenza Age 5 to Adult  08/01/2019   . Jace Mejia is updated on all     Pt currently taking Antiplatelet therapy? coordination of Care:  1. Have you been to the ER, urgent care clinic since your last visit? no Hospitalized since your last visit? no    2.  Have you seen or consulted any other health care providers outside of the [address] [place] [address] since your last visit? no Include any pap smears or colon screening.  no

## 2019-08-14 ENCOUNTER — HOSPITAL ENCOUNTER (OUTPATIENT)
Age: 74
Discharge: HOME | End: 2019-08-14
Attending: INTERNAL MEDICINE
Payer: MEDICARE

## 2019-08-14 DIAGNOSIS — M54.32 SCIATICA OF LEFT SIDE: ICD-10-CM

## 2019-08-14 PROCEDURE — 72148 MRI LUMBAR SPINE W/O DYE: CPT

## 2019-08-15 DIAGNOSIS — M54.32 SCIATICA OF LEFT SIDE: Primary | ICD-10-CM
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
## 2019-08-25 DIAGNOSIS — M54.41 ACUTE RIGHT-SIDED LOW BACK PAIN WITH RIGHT-SIDED SCIATICA: ICD-10-CM

## 2019-08-25 RX ORDER — OXYCODONE AND ACETAMINOPHEN 7.5; 325 MG/1; MG/1
1 TABLET ORAL
Qty: 28 TAB | Refills: 0 | Status: SHIPPED | OUTPATIENT
Start: 2019-08-25 | End: 2019-11-20 | Stop reason: SDUPTHER

## 2019-08-30 RX ORDER — GLIMEPIRIDE 2 MG/1
TABLET ORAL
Qty: 60 TAB | Refills: 1 | Status: SHIPPED | OUTPATIENT
Start: 2019-08-30 | End: 2019-09-18

## 2019-09-16 ENCOUNTER — HOSPITAL ENCOUNTER (OUTPATIENT)
Dept: LAB | Age: 74
Discharge: HOME | End: 2019-09-16
Payer: MEDICARE

## 2019-09-16 DIAGNOSIS — E11.65 UNCONTROLLED TYPE 2 DIABETES MELLITUS WITH HYPERGLYCEMIA (HCC): ICD-10-CM

## 2019-09-16 LAB
ALBUMIN SERPL-MCNC: 3.9 G/DL (ref 3.4–5)
ALBUMIN/GLOB SERPL: 1.4 {RATIO} (ref 0.8–1.7)
ALP SERPL-CCNC: 61 U/L (ref 45–117)
ALT SERPL-CCNC: 23 U/L (ref 16–61)
ANION GAP SERPL CALC-SCNC: 4 MMOL/L (ref 3–18)
AST SERPL-CCNC: 17 U/L (ref 10–38)
BILIRUB SERPL-MCNC: 0.3 MG/DL (ref 0.2–1)
BUN SERPL-MCNC: 22 MG/DL (ref 7–18)
BUN/CREAT SERPL: 35 (ref 12–20)
CALCIUM SERPL-MCNC: 9.4 MG/DL (ref 8.5–10.1)
CHLORIDE SERPL-SCNC: 105 MMOL/L (ref 100–111)
CO2 SERPL-SCNC: 32 MMOL/L (ref 21–32)
CREAT SERPL-MCNC: 0.63 MG/DL (ref 0.6–1.3)
EST. AVERAGE GLUCOSE BLD GHB EST-MCNC: 171 MG/DL
GLOBULIN SER CALC-MCNC: 2.8 G/DL (ref 2–4)
GLUCOSE SERPL-MCNC: 120 MG/DL (ref 74–99)
HBA1C MFR BLD: 7.6 % (ref 4.2–5.6)
POTASSIUM SERPL-SCNC: 4.6 MMOL/L (ref 3.5–5.5)
PROT SERPL-MCNC: 6.7 G/DL (ref 6.4–8.2)
SODIUM SERPL-SCNC: 141 MMOL/L (ref 136–145)

## 2019-09-16 PROCEDURE — 36415 COLL VENOUS BLD VENIPUNCTURE: CPT

## 2019-09-16 PROCEDURE — 80053 COMPREHEN METABOLIC PANEL: CPT

## 2019-09-16 PROCEDURE — 83036 HEMOGLOBIN GLYCOSYLATED A1C: CPT

## 2019-09-16 RX ORDER — RAMIPRIL 5 MG/1
CAPSULE ORAL
Qty: 90 CAP | Refills: 1 | Status: SHIPPED | OUTPATIENT
Start: 2019-09-16 | End: 2019-09-18 | Stop reason: ALTCHOICE

## 2019-09-16 RX ORDER — ROSUVASTATIN CALCIUM 5 MG/1
TABLET, COATED ORAL
Qty: 90 TAB | Refills: 0 | Status: SHIPPED | OUTPATIENT
Start: 2019-09-16 | End: 2019-12-10 | Stop reason: SDUPTHER

## 2019-09-16 RX ORDER — SERTRALINE HYDROCHLORIDE 100 MG/1
TABLET, FILM COATED ORAL
Qty: 90 TAB | Refills: 0 | Status: SHIPPED | OUTPATIENT
Start: 2019-09-16 | End: 2019-12-10 | Stop reason: SDUPTHER

## 2019-09-18 ENCOUNTER — OFFICE VISIT (OUTPATIENT)
Dept: FAMILY MEDICINE CLINIC | Facility: CLINIC | Age: 74
End: 2019-09-18

## 2019-09-18 VITALS
SYSTOLIC BLOOD PRESSURE: 111 MMHG | RESPIRATION RATE: 12 BRPM | TEMPERATURE: 97 F | HEART RATE: 72 BPM | WEIGHT: 189 LBS | BODY MASS INDEX: 25.05 KG/M2 | HEIGHT: 73 IN | OXYGEN SATURATION: 97 % | DIASTOLIC BLOOD PRESSURE: 46 MMHG

## 2019-09-18 DIAGNOSIS — I10 ESSENTIAL HYPERTENSION, BENIGN: ICD-10-CM

## 2019-09-18 DIAGNOSIS — M54.32 SCIATICA OF LEFT SIDE: Primary | ICD-10-CM

## 2019-09-18 DIAGNOSIS — E11.9 CONTROLLED TYPE 2 DIABETES MELLITUS WITHOUT COMPLICATION, WITHOUT LONG-TERM CURRENT USE OF INSULIN (HCC): ICD-10-CM

## 2019-09-18 RX ORDER — RAMIPRIL 10 MG/1
10 CAPSULE ORAL DAILY
COMMUNITY
End: 2019-09-18 | Stop reason: ALTCHOICE

## 2019-09-18 RX ORDER — RAMIPRIL 10 MG/1
10 CAPSULE ORAL DAILY
Qty: 90 CAP | Refills: 1 | Status: SHIPPED | OUTPATIENT
Start: 2019-09-18

## 2019-09-18 RX ORDER — GLIMEPIRIDE 2 MG/1
TABLET ORAL
Qty: 90 TAB | Refills: 1 | Status: SHIPPED | OUTPATIENT
Start: 2019-09-18 | End: 2019-12-22 | Stop reason: ALTCHOICE

## 2019-09-24 RX ORDER — GLIMEPIRIDE 2 MG/1
TABLET ORAL
Qty: 60 TAB | Refills: 1 | Status: SHIPPED | OUTPATIENT
Start: 2019-09-24 | End: 2019-12-22 | Stop reason: ALTCHOICE
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
## 2019-10-25 ENCOUNTER — PATIENT OUTREACH (OUTPATIENT)
Dept: FAMILY MEDICINE CLINIC | Facility: CLINIC | Age: 74
End: 2019-10-25

## 2019-10-25 NOTE — PROGRESS NOTES
Complex Case Management      Date/Time:  10/25/2019 10:27 AM    Method of communication with patient:phone    Nurse Navigator (NN) contacted the patient by telephone to perform Ambulatory Care Coordination. Verified name and  with patient as identifiers. Provided introduction to self, and explanation of the Ambulatory Care Manager's role. Reviewed most recent clinic visit w/ patient who verbalized understanding. Patient given an opportunity to ask questions. Top Challenges reviewed with the patient   1. HLD/HTN/DM2 hx and medication usage  2. Pt states feeling well, no issues at this time     The patient agrees to contact the PCP office or the [address] [place] [address] for questions related to their healthcare. Provided contact information for future reference. Disease Specific:   N/A    Home Health Active: No    DME Active: No    Barriers to care? none    Advance Care Planning:   Does patient have an Advance Directive:  not on file; education provided     Medication(s):   Medication reconciliation was not performed with patient, who declined, but verbalizes understanding of administration of home medications. There were no barriers to obtaining medications identified at this time. Referral to Pharm D needed: no     Current Outpatient Medications   Medication Sig    glimepiride (AMARYL) 2 mg tablet TAKE 1 TABLET BY MOUTH TWICE A DAY    ramipril (ALTACE) 10 mg capsule Take 1 Cap by mouth daily.  glimepiride (AMARYL) 2 mg tablet TAKE 1 TABLET DAILY    sertraline (ZOLOFT) 100 mg tablet TAKE 1 TABLET BY MOUTH DAILY    rosuvastatin (CRESTOR) 5 mg tablet TAKE 1 TABLET BY MOUTH EVERY DAY AT NIGHT    metFORMIN (GLUCOPHAGE) 1,000 mg tablet Take 1,000 mg by mouth two (2) times daily (with meals).     gabapentin (NEURONTIN) 300 mg capsule 1 cap three times per day (stop \"old\" Gabapentin)    predniSONE (DELTASONE) 20 mg tablet 3 tabs daily x 3 days, 2 tabs daily x 3 days, 1 tab daily x 3 days, 1/2 tab daily x 3 days    FARXIGA 10 mg tab tablet TAKE 1 TAB BY MOUTH DAILY.  pioglitazone (ACTOS) 15 mg tablet TAKE 1 TABLET BY MOUTH EVERY DAY    TRULICITY 1.5 UL/4.2 mL sub-q pen INJECT 1 INJECTION UNDER THE SKIN ONCE WEEKLY     No current facility-administered medications for this visit.         [facility] follow up appointment(s):   Future Appointments   Date Time Provider Florencia Nunez   12/17/2019 11:00 AM MD VIOLETA Craig-LUNA GIFFORD        Non-[facility] follow up appointment(s): none    Goals      Attends follow up appointments on schedule      10/25/19 Patient will attend all scheduled appointments through 12/25/19       Knowledge and adherence of prescribed medication (ie. action, side effects, missed dose, etc.).      10/25/19 Pt will take all medications prescribed to be evaluated on each outreach through  12/25/19       Prepare patients and caregivers for end of life decisions (ie. need for hospice, pain management, symptom relief, advance directives etc.)      10/25/19 Pt will complete an ACP and have it scanned into their EMR by 12/25/19

## 2019-11-01 ENCOUNTER — PATIENT OUTREACH (OUTPATIENT)
Dept: FAMILY MEDICINE CLINIC | Facility: CLINIC | Age: 74
End: 2019-11-01

## 2019-11-07 ENCOUNTER — PATIENT OUTREACH (OUTPATIENT)
Dept: FAMILY MEDICINE CLINIC | Facility: CLINIC | Age: 74
End: 2019-11-07

## 2019-11-07 NOTE — PROGRESS NOTES
Complex Case Management      Date/Time:  2019 10:27 AM    Method of communication with patient:phone    Nurse Navigator (NN) contacted the patient by telephone to perform Ambulatory Care Coordination. Verified name and  with patient as identifiers. Provided introduction to self, and explanation of the Ambulatory Care Manager's role. Reviewed most recent clinic visit w/ patient who verbalized understanding. Patient given an opportunity to ask questions. Top Challenges reviewed with the patient   1. HLD/HTN/DM2 hx and medication usage  2. Does state concern for continued orthostatic hypotension sx when standing. States sx always resolve, but he worries that it may cause a fall. Educated pt on bp medications side effects. Also discussed fall prevention strategies. 3. Pt states feeling well, no other issues at this time. The patient agrees to contact the PCP office or the [address] [place] [address] for questions related to their healthcare. Provided contact information for future reference. Disease Specific:   N/A    Home Health Active: No    DME Active: No    Barriers to care? none    Advance Care Planning:   Does patient have an Advance Directive:  not on file; education provided     Medication(s):   Medication reconciliation was not performed with patient, who declined, but verbalizes understanding of administration of home medications. There were no barriers to obtaining medications identified at this time. Referral to Pharm D needed: no     Current Outpatient Medications   Medication Sig    glimepiride (AMARYL) 2 mg tablet TAKE 1 TABLET BY MOUTH TWICE A DAY    ramipril (ALTACE) 10 mg capsule Take 1 Cap by mouth daily.     glimepiride (AMARYL) 2 mg tablet TAKE 1 TABLET DAILY    sertraline (ZOLOFT) 100 mg tablet TAKE 1 TABLET BY MOUTH DAILY    rosuvastatin (CRESTOR) 5 mg tablet TAKE 1 TABLET BY MOUTH EVERY DAY AT NIGHT    metFORMIN (GLUCOPHAGE) 1,000 mg tablet Take 1,000 mg by mouth two (2) times daily (with meals).  gabapentin (NEURONTIN) 300 mg capsule 1 cap three times per day (stop \"old\" Gabapentin)    predniSONE (DELTASONE) 20 mg tablet 3 tabs daily x 3 days, 2 tabs daily x 3 days, 1 tab daily x 3 days, 1/2 tab daily x 3 days    FARXIGA 10 mg tab tablet TAKE 1 TAB BY MOUTH DAILY.  pioglitazone (ACTOS) 15 mg tablet TAKE 1 TABLET BY MOUTH EVERY DAY    TRULICITY 1.5 VB/4.5 mL sub-q pen INJECT 1 INJECTION UNDER THE SKIN ONCE WEEKLY     No current facility-administered medications for this visit.         [facility] follow up appointment(s):   Future Appointments   Date Time Provider Florencia Nunez   12/17/2019 11:00 AM Janise Dakins, MD [facility] SCHED        Non-[facility] follow up appointment(s): none    Goals      Attends follow up appointments on schedule      10/25/19 Patient will attend all scheduled appointments through 12/25/19       Knowledge and adherence of prescribed medication (ie. action, side effects, missed dose, etc.).      10/25/19 Pt will take all medications prescribed to be evaluated on each outreach through  12/25/19       Prepare patients and caregivers for end of life decisions (ie. need for hospice, pain management, symptom relief, advance directives etc.)      10/25/19 Pt will complete an ACP and have it scanned into their EMR by 12/25/19

## 2019-11-13 ENCOUNTER — PATIENT OUTREACH (OUTPATIENT)
Dept: FAMILY MEDICINE CLINIC | Facility: CLINIC | Age: 74
End: 2019-11-13

## 2019-11-20 DIAGNOSIS — M54.41 ACUTE RIGHT-SIDED LOW BACK PAIN WITH RIGHT-SIDED SCIATICA: ICD-10-CM

## 2019-11-20 NOTE — TELEPHONE ENCOUNTER
Requested Prescriptions     Pending Prescriptions Disp Refills    oxyCODONE-acetaminophen (PERCOCET 7.5) 7.5-325 mg per tablet 28 Tab 0     Sig: Take 1 Tab by mouth every six (6) hours as needed for Pain for up to 3 days. Max Daily Amount: 4 Tabs.

## 2019-11-22 RX ORDER — OXYCODONE AND ACETAMINOPHEN 7.5; 325 MG/1; MG/1
1 TABLET ORAL
Qty: 28 TAB | Refills: 0 | Status: SHIPPED | OUTPATIENT
Start: 2019-11-22 | End: 2019-11-25

## 2019-11-26 ENCOUNTER — PATIENT OUTREACH (OUTPATIENT)
Dept: FAMILY MEDICINE CLINIC | Facility: CLINIC | Age: 74
End: 2019-11-26

## 2019-11-26 NOTE — PROGRESS NOTES
Complex Case Management      Date/Time:  2019 10:27 AM    Method of communication with patient:phone    Nurse Navigator (NN) contacted the patient by telephone to perform Ambulatory Care Coordination. Verified name and  with patient as identifiers. Patient given an opportunity to ask questions. Top Challenges reviewed with the patient   1. HLD/HTN/DM2 hx and medication usage  2. States recently had form sent to PCP office from his Podiatrist's office to be signed for diabetic shoes. Reports that he hasn't heard back from the Podiatrist's yet. Call podiatrist office and they state that the form has not been returned yet. Called PCP's office and they state that the form is waiting on signature from the MD.  Will follow. The patient agrees to contact the PCP office or the [address] [place] [address] for questions related to their healthcare. Provided contact information for future reference. Disease Specific:   N/A    Home Health Active: No    DME Active: No    Barriers to care? none    Advance Care Planning:   Does patient have an Advance Directive:  not on file; education provided     Medication(s):   Medication reconciliation was not performed with patient, who declined, but verbalizes understanding of administration of home medications. There were no barriers to obtaining medications identified at this time. Referral to Pharm D needed: no     Current Outpatient Medications   Medication Sig    glimepiride (AMARYL) 2 mg tablet TAKE 1 TABLET BY MOUTH TWICE A DAY    ramipril (ALTACE) 10 mg capsule Take 1 Cap by mouth daily.  glimepiride (AMARYL) 2 mg tablet TAKE 1 TABLET DAILY    sertraline (ZOLOFT) 100 mg tablet TAKE 1 TABLET BY MOUTH DAILY    rosuvastatin (CRESTOR) 5 mg tablet TAKE 1 TABLET BY MOUTH EVERY DAY AT NIGHT    metFORMIN (GLUCOPHAGE) 1,000 mg tablet Take 1,000 mg by mouth two (2) times daily (with meals).     gabapentin (NEURONTIN) 300 mg capsule 1 cap three times per day (stop \"old\" Gabapentin)    predniSONE (DELTASONE) 20 mg tablet 3 tabs daily x 3 days, 2 tabs daily x 3 days, 1 tab daily x 3 days, 1/2 tab daily x 3 days    FARXIGA 10 mg tab tablet TAKE 1 TAB BY MOUTH DAILY.  pioglitazone (ACTOS) 15 mg tablet TAKE 1 TABLET BY MOUTH EVERY DAY    TRULICITY 1.5 CG/4.8 mL sub-q pen INJECT 1 INJECTION UNDER THE SKIN ONCE WEEKLY     No current facility-administered medications for this visit.         [facility] follow up appointment(s):   Future Appointments   Date Time Provider Florencia Nunez   12/17/2019 11:00 AM MD ELADIO Craig        Non-[facility] follow up appointment(s): none    Goals      Attends follow up appointments on schedule      10/25/19 Patient will attend all scheduled appointments through 12/25/19       Knowledge and adherence of prescribed medication (ie. action, side effects, missed dose, etc.).      10/25/19 Pt will take all medications prescribed to be evaluated on each outreach through  12/25/19       Prepare patients and caregivers for end of life decisions (ie. need for hospice, pain management, symptom relief, advance directives etc.)      10/25/19 Pt will complete an ACP and have it scanned into their EMR by 12/25/19

## 2019-12-02 ENCOUNTER — OFFICE VISIT (OUTPATIENT)
Dept: FAMILY MEDICINE CLINIC | Facility: CLINIC | Age: 74
End: 2019-12-02

## 2019-12-02 VITALS
BODY MASS INDEX: 25.54 KG/M2 | DIASTOLIC BLOOD PRESSURE: 78 MMHG | HEIGHT: 74 IN | HEART RATE: 78 BPM | SYSTOLIC BLOOD PRESSURE: 126 MMHG | WEIGHT: 199 LBS

## 2019-12-02 DIAGNOSIS — E11.42 CONTROLLED TYPE 2 DIABETES MELLITUS WITH DIABETIC POLYNEUROPATHY, WITHOUT LONG-TERM CURRENT USE OF INSULIN (HCC): Primary | ICD-10-CM
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
## 2019-12-10 RX ORDER — ROSUVASTATIN CALCIUM 5 MG/1
TABLET, COATED ORAL
Qty: 90 TAB | Refills: 0 | Status: SHIPPED | OUTPATIENT
Start: 2019-12-10

## 2019-12-10 RX ORDER — SERTRALINE HYDROCHLORIDE 100 MG/1
TABLET, FILM COATED ORAL
Qty: 90 TAB | Refills: 0 | Status: SHIPPED | OUTPATIENT
Start: 2019-12-10 | End: 2019-12-17 | Stop reason: SDUPTHER

## 2019-12-17 ENCOUNTER — OFFICE VISIT (OUTPATIENT)
Dept: FAMILY MEDICINE CLINIC | Facility: CLINIC | Age: 74
End: 2019-12-17

## 2019-12-17 VITALS
BODY MASS INDEX: 26.37 KG/M2 | HEART RATE: 70 BPM | HEIGHT: 73 IN | TEMPERATURE: 98.3 F | RESPIRATION RATE: 16 BRPM | WEIGHT: 199 LBS | SYSTOLIC BLOOD PRESSURE: 120 MMHG | OXYGEN SATURATION: 97 % | DIASTOLIC BLOOD PRESSURE: 70 MMHG

## 2019-12-17 DIAGNOSIS — E11.42 CONTROLLED TYPE 2 DIABETES MELLITUS WITH DIABETIC POLYNEUROPATHY, WITHOUT LONG-TERM CURRENT USE OF INSULIN (HCC): Primary | ICD-10-CM

## 2019-12-17 DIAGNOSIS — J06.9 ACUTE URI: ICD-10-CM

## 2019-12-17 RX ORDER — BENZONATATE 200 MG/1
CAPSULE ORAL
Qty: 30 CAP | Refills: 1 | Status: SHIPPED | OUTPATIENT
Start: 2019-12-17

## 2019-12-17 RX ORDER — SERTRALINE HYDROCHLORIDE 100 MG/1
TABLET, FILM COATED ORAL
Qty: 90 TAB | Refills: 1 | Status: SHIPPED | OUTPATIENT
Start: 2019-12-17

## 2019-12-17 RX ORDER — CEFUROXIME AXETIL 500 MG/1
TABLET ORAL
Qty: 14 TAB | Refills: 0 | Status: SHIPPED | OUTPATIENT
Start: 2019-12-17 | End: 2020-01-02 | Stop reason: ALTCHOICE

## 2019-12-17 NOTE — PROGRESS NOTES
Lindsey Davenport presents today for   Chief Complaint   Patient presents with    Well Male    Cold Symptoms       Lindsey Davenport preferred language for health care discussion is english. Is someone accompanying this pt? no    Is the patient using any DME equipment during [address]? no    Depression Screening:  3 most recent PHQ Screens 9/18/2019   Little interest or pleasure in doing things Not at all   Feeling down, depressed, irritable, or hopeless Not at all   Total Score PHQ 2 0       Learning Assessment:  Learning Assessment 6/13/2016   PRIMARY LEARNER Patient   HIGHEST LEVEL OF EDUCATION - PRIMARY LEARNER  DID NOT GRADUATE HIGH SCHOOL   BARRIERS PRIMARY LEARNER NONE   CO-LEARNER CAREGIVER No   CO-LEARNER NAME bia cronin   CO-LEARNER HIGHEST LEVEL OF EDUCATION GRADUATED HIGH SCHOOL OR GED   BARRIERS CO-LEARNER NONE   PRIMARY LANGUAGE ENGLISH   PRIMARY LANGUAGE [facility]    NEED No   LEARNER PREFERENCE PRIMARY PICTURES   ANSWERED BY patient   RELATIONSHIP SELF       Abuse Screening:  Abuse Screening Questionnaire 11/12/2018   Do you ever feel afraid of your partner? N   Are you in a relationship with someone who physically or mentally threatens you? N   Is it safe for you to go home? Y       Fall Risk  Fall Risk Assessment, last 12 mths 11/7/2019   Able to walk? Yes   Fall in past 12 months? No   Fall with injury? -   Number of falls in past 12 months -   Fall Risk Score -       Health Maintenance reviewed and discussed and ordered per Provider. Health Maintenance Due   Topic Date Due    Hepatitis C Screening  1945    Shingrix Vaccine Age 50> (1 of 2) 07/09/1996    Influenza Age 5 to Adult  08/01/2019    MICROALBUMIN Q1  09/28/2019    MEDICARE YEARLY EXAM  11/13/2019   . Lindseycharlie Davenport is updated on all     Pt currently taking Antiplatelet therapy? no    Coordination of Care:  1.  Have you been to the ER, urgent care clinic since your last visit? no Hospitalized since your last visit? no    2. Have you seen or consulted any other health care providers outside of the [address] [place] [address] since your last visit? no Include any pap smears or colon screening.  no

## 2019-12-18 ENCOUNTER — PATIENT OUTREACH (OUTPATIENT)
Dept: FAMILY MEDICINE CLINIC | Facility: CLINIC | Age: 74
End: 2019-12-18

## 2019-12-18 NOTE — PROGRESS NOTES
Patient attended appointments with his PCP, Dr James Orozco. on 12/17/19, Nurse Navigator reviewed EMR and will follow up on next scheduled outreach.

## 2020-01-02 ENCOUNTER — PATIENT OUTREACH (OUTPATIENT)
Dept: FAMILY MEDICINE CLINIC | Facility: CLINIC | Age: 75
End: 2020-01-02

## 2020-01-02 NOTE — PROGRESS NOTES
Complex Case Management      Date/Time:  2020 10:27 AM    Method of communication with patient:phone    Nurse Navigator (NN) contacted the patient by telephone to perform Ambulatory Care Coordination. Verified name and  with patient as identifiers. Patient given an opportunity to ask questions. Top Challenges reviewed with the patient   1. HLD/HTN/DM2 hx and medication usage  2. States doing well, no issues currently. 3. Reports podiatry paperwork was completed appropriately. 4. Pt states moving from Dr Malinda James office as his PCP. Has new PCP assigned. The patient agrees to contact the PCP office or the [address] [place] [address] for questions related to their healthcare. Provided contact information for future reference. Disease Specific:   N/A    Home Health Active: No    DME Active: No    Barriers to care? none    Advance Care Planning:   Does patient have an Advance Directive:  not on file; education provided     Medication(s):   Medication reconciliation was not performed with patient, who declined, but verbalizes understanding of administration of home medications. There were no barriers to obtaining medications identified at this time. Referral to Pharm D needed: no     Current Outpatient Medications   Medication Sig    cefUROXime (CEFTIN) 500 mg tablet 1 tab twice per day    benzonatate (TESSALON) 200 mg capsule 1 cap three times per day    sertraline (ZOLOFT) 100 mg tablet TAKE 1 TABLET BY MOUTH DAILY    rosuvastatin (CRESTOR) 5 mg tablet TAKE 1 TABLET BY MOUTH EVERY DAY AT NIGHT    ramipril (ALTACE) 10 mg capsule Take 1 Cap by mouth daily.  metFORMIN (GLUCOPHAGE) 1,000 mg tablet Take 1,000 mg by mouth two (2) times daily (with meals).  gabapentin (NEURONTIN) 300 mg capsule 1 cap three times per day (stop \"old\" Gabapentin)    FARXIGA 10 mg tab tablet TAKE 1 TAB BY MOUTH DAILY.     pioglitazone (ACTOS) 15 mg tablet TAKE 1 TABLET BY MOUTH EVERY DAY    TRULICITY 1.5 TW/3.5 mL sub-q pen INJECT 1 INJECTION UNDER THE SKIN ONCE WEEKLY     No current facility-administered medications for this visit. [facility] follow up appointment(s):   No future appointments.      Non-[facility] follow up appointment(s): none    Goals      Attends follow up appointments on schedule      10/25/19 Patient will attend all scheduled appointments through 12/25/19       Knowledge and adherence of prescribed medication (ie. action, side effects, missed dose, etc.).      10/25/19 Pt will take all medications prescribed to be evaluated on each outreach through  12/25/19       Prepare patients and caregivers for end of life decisions (ie. need for hospice, pain management, symptom relief, advance directives etc.)      10/25/19 Pt will complete an ACP and have it scanned into their EMR by 12/25/19

## 2020-01-17 ENCOUNTER — PATIENT OUTREACH (OUTPATIENT)
Dept: FAMILY MEDICINE CLINIC | Facility: CLINIC | Age: 75
End: 2020-01-17

## 2020-01-17 NOTE — PROGRESS NOTES
Patient has graduated from the Complex Case Management  program on 1/17/20. Patient's symptoms are stable at this time. Patient/family has the ability to self-manage. Care management goals have been completed at this time. No further nurse navigator follow up scheduled. Goals Addressed                 This Visit's Progress     COMPLETED: Attends follow up appointments on schedule        10/25/19 Patient will attend all scheduled appointments through 12/25/19       COMPLETED: Knowledge and adherence of prescribed medication (ie. action, side effects, missed dose, etc.).        10/25/19 Pt will take all medications prescribed to be evaluated on each outreach through  12/25/19       COMPLETED: Prepare patients and caregivers for end of life decisions (ie. need for hospice, pain management, symptom relief, advance directives etc.)        10/25/19 Pt will complete an ACP and have it scanned into their EMR by 12/25/19            Pt has nurse navigator's contact information for any further questions, concerns, or needs. Patients upcoming visits:  No future appointments.

## 2020-11-23 ENCOUNTER — TRANSCRIBE ORDER (OUTPATIENT)
Dept: SCHEDULING | Age: 75
End: 2020-11-23

## 2020-11-23 DIAGNOSIS — G60.3 IDIOPATHIC PROGRESSIVE POLYNEUROPATHY: Primary | ICD-10-CM

## 2020-12-11 ENCOUNTER — HOSPITAL ENCOUNTER (OUTPATIENT)
Age: 75
Discharge: HOME | End: 2020-12-11
Payer: MEDICARE

## 2020-12-11 DIAGNOSIS — G60.3 IDIOPATHIC PROGRESSIVE POLYNEUROPATHY: ICD-10-CM

## 2020-12-11 PROCEDURE — 70551 MRI BRAIN STEM W/O DYE: CPT

## 2020-12-24 ENCOUNTER — HOSPITAL ENCOUNTER (OUTPATIENT)
Dept: LAB | Age: 75
Discharge: HOME | End: 2020-12-24
Payer: MEDICARE

## 2020-12-24 PROCEDURE — 87635 SARS-COV-2 COVID-19 AMP PRB: CPT

## 2020-12-25 LAB — SARS-COV-2, COV2NT: NOT DETECTED

## 2020-12-28 ENCOUNTER — ANESTHESIA EVENT (OUTPATIENT)
Dept: ENDOSCOPY | Age: 75
End: 2020-12-28
Payer: MEDICARE

## 2020-12-29 ENCOUNTER — ANESTHESIA (OUTPATIENT)
Dept: ENDOSCOPY | Age: 75
End: 2020-12-29
Payer: MEDICARE

## 2020-12-29 ENCOUNTER — HOSPITAL ENCOUNTER (OUTPATIENT)
Age: 75
Setting detail: OUTPATIENT SURGERY
Discharge: HOME | End: 2020-12-29
Attending: INTERNAL MEDICINE | Admitting: INTERNAL MEDICINE
Payer: MEDICARE

## 2020-12-29 VITALS
HEIGHT: 74 IN | RESPIRATION RATE: 13 BRPM | OXYGEN SATURATION: 100 % | DIASTOLIC BLOOD PRESSURE: 70 MMHG | SYSTOLIC BLOOD PRESSURE: 127 MMHG | WEIGHT: 195 LBS | TEMPERATURE: 98 F | BODY MASS INDEX: 25.03 KG/M2 | HEART RATE: 61 BPM

## 2020-12-29 LAB — GLUCOSE BLD STRIP.AUTO-MCNC: 148 MG/DL (ref 70–110)

## 2020-12-29 PROCEDURE — 99100 ANES PT EXTEME AGE<1 YR&>70: CPT | Performed by: ANESTHESIOLOGY

## 2020-12-29 PROCEDURE — 76060000031 HC ANESTHESIA FIRST 0.5 HR: Performed by: INTERNAL MEDICINE

## 2020-12-29 PROCEDURE — 74011250636 HC RX REV CODE- 250/636: Performed by: NURSE ANESTHETIST, CERTIFIED REGISTERED

## 2020-12-29 PROCEDURE — 74011250636 HC RX REV CODE- 250/636: Performed by: ANESTHESIOLOGY

## 2020-12-29 PROCEDURE — 2709999900 HC NON-CHARGEABLE SUPPLY: Performed by: INTERNAL MEDICINE

## 2020-12-29 PROCEDURE — 00812 ANES LWR INTST SCR COLSC: CPT | Performed by: ANESTHESIOLOGY

## 2020-12-29 PROCEDURE — 76040000019: Performed by: INTERNAL MEDICINE

## 2020-12-29 PROCEDURE — 82962 GLUCOSE BLOOD TEST: CPT

## 2020-12-29 RX ORDER — SODIUM CHLORIDE, SODIUM LACTATE, POTASSIUM CHLORIDE, CALCIUM CHLORIDE 600; 310; 30; 20 MG/100ML; MG/100ML; MG/100ML; MG/100ML
75 INJECTION, SOLUTION INTRAVENOUS CONTINUOUS
Status: DISCONTINUED | OUTPATIENT
Start: 2020-12-29 | End: 2020-12-29 | Stop reason: HOSPADM

## 2020-12-29 RX ORDER — SODIUM CHLORIDE 0.9 % (FLUSH) 0.9 %
5-40 SYRINGE (ML) INJECTION AS NEEDED
Status: DISCONTINUED | OUTPATIENT
Start: 2020-12-29 | End: 2020-12-29 | Stop reason: HOSPADM

## 2020-12-29 RX ORDER — MAGNESIUM SULFATE 100 %
4 CRYSTALS MISCELLANEOUS AS NEEDED
Status: DISCONTINUED | OUTPATIENT
Start: 2020-12-29 | End: 2020-12-29 | Stop reason: HOSPADM

## 2020-12-29 RX ORDER — INSULIN LISPRO 100 [IU]/ML
INJECTION, SOLUTION INTRAVENOUS; SUBCUTANEOUS ONCE
Status: DISCONTINUED | OUTPATIENT
Start: 2020-12-29 | End: 2020-12-29 | Stop reason: HOSPADM

## 2020-12-29 RX ORDER — LIDOCAINE HYDROCHLORIDE 10 MG/ML
0.1 INJECTION, SOLUTION EPIDURAL; INFILTRATION; INTRACAUDAL; PERINEURAL AS NEEDED
Status: DISCONTINUED | OUTPATIENT
Start: 2020-12-29 | End: 2020-12-29 | Stop reason: HOSPADM

## 2020-12-29 RX ORDER — SODIUM CHLORIDE 0.9 % (FLUSH) 0.9 %
5-40 SYRINGE (ML) INJECTION EVERY 8 HOURS
Status: DISCONTINUED | OUTPATIENT
Start: 2020-12-29 | End: 2020-12-29 | Stop reason: HOSPADM

## 2020-12-29 RX ORDER — PROPOFOL 10 MG/ML
INJECTION, EMULSION INTRAVENOUS AS NEEDED
Status: DISCONTINUED | OUTPATIENT
Start: 2020-12-29 | End: 2020-12-29 | Stop reason: HOSPADM

## 2020-12-29 RX ORDER — FAMOTIDINE 20 MG/1
20 TABLET, FILM COATED ORAL ONCE
Status: DISCONTINUED | OUTPATIENT
Start: 2020-12-29 | End: 2020-12-29 | Stop reason: HOSPADM

## 2020-12-29 RX ORDER — DEXTROSE 50 % IN WATER (D50W) INTRAVENOUS SYRINGE
25-50 AS NEEDED
Status: DISCONTINUED | OUTPATIENT
Start: 2020-12-29 | End: 2020-12-29 | Stop reason: HOSPADM

## 2020-12-29 RX ADMIN — PROPOFOL 100 MG: 10 INJECTION, EMULSION INTRAVENOUS at 08:06

## 2020-12-29 RX ADMIN — PROPOFOL 50 MG: 10 INJECTION, EMULSION INTRAVENOUS at 08:11

## 2020-12-29 RX ADMIN — PROPOFOL 50 MG: 10 INJECTION, EMULSION INTRAVENOUS at 08:16

## 2020-12-29 RX ADMIN — SODIUM CHLORIDE, SODIUM LACTATE, POTASSIUM CHLORIDE, AND CALCIUM CHLORIDE 75 ML/HR: 600; 310; 30; 20 INJECTION, SOLUTION INTRAVENOUS at 07:37

## 2020-12-29 NOTE — ANESTHESIA PREPROCEDURE EVALUATION
Relevant Problems   No relevant active problems       Anesthetic History   No history of anesthetic complications            Review of Systems / Medical History  Patient summary reviewed and pertinent labs reviewed    Pulmonary        Sleep apnea: No treatment           Neuro/Psych       CVA  Psychiatric history     Cardiovascular    Hypertension: well controlled                Comments: Recent URI   GI/Hepatic/Renal  Within defined limits              Endo/Other    Diabetes: type 2    Arthritis     Other Findings              Physical Exam    Airway  Mallampati: II  TM Distance: > 6 cm  Neck ROM: normal range of motion   Mouth opening: Normal    Comments: Large beard Cardiovascular               Dental         Pulmonary                Comments: Non labored Abdominal  GI exam deferred       Other Findings            Anesthetic Plan    ASA: 3  Anesthesia type: MAC            Anesthetic plan and risks discussed with: Patient
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
## 2020-12-29 NOTE — ANESTHESIA POSTPROCEDURE EVALUATION
Procedure(s):  COLONOSCOPY. MAC    Anesthesia Post Evaluation      Multimodal analgesia: multimodal analgesia used between 6 hours prior to anesthesia start to PACU discharge  Patient location during evaluation: bedside  Patient participation: complete - patient participated  Level of consciousness: awake and alert  Pain management: adequate  Airway patency: patent  Anesthetic complications: no  Cardiovascular status: acceptable  Respiratory status: acceptable  Hydration status: acceptable  Post anesthesia nausea and vomiting:  none      INITIAL Post-op Vital signs:   Vitals Value Taken Time   /59 12/29/20 0830   Temp     Pulse 65 12/29/20 0832   Resp 15 12/29/20 0832   SpO2 100 % 12/29/20 0832   Vitals shown include unvalidated device data.

## 2020-12-29 NOTE — DISCHARGE INSTRUCTIONS
Colonoscopy: What to Expect at [address] [place] [address]  After you have a colonoscopy, you will stay at the clinic for 1 to 2 hours until the medicines wear off. Then you can go home. But you will need to arrange for a ride. Your doctor will tell you when you can eat and do your other usual activities. Your doctor will talk to you about when you will need your next colonoscopy. Your doctor can help you decide how often you need to be checked. This will depend on the results of your test and your risk for colorectal cancer. After the test, you may be bloated or have gas pains. You may need to pass gas. If a biopsy was done or a polyp was removed, you may have streaks of blood in your stool (feces) for a few days. This care sheet gives you a general idea about how long it will take for you to recover. But each person recovers at a different pace. Follow the steps below to get better as quickly as possible. How can you care for yourself at home? Activity  · Rest when you feel tired. · You can do your normal activities when it feels okay to do so. Diet  · Follow your doctor's directions for eating. · Unless your doctor has told you not to, drink plenty of fluids. This helps to replace the fluids that were lost during the colon prep. · Do not drink alcohol. Medicines  · If polyps were removed or a biopsy was done during the test, your doctor may tell you not to take aspirin or other anti-inflammatory medicines for a few days. These include ibuprofen (Advil, Motrin) and naproxen (Aleve). Other instructions  · For your safety, do not drive or operate machinery until the medicine wears off and you can think clearly. Your doctor may tell you not to drive or operate machinery until the day after your test.  · Do not sign legal documents or make major decisions until the medicine wears off and you can think clearly. The anesthesia can make it hard for you to fully understand what you are agreeing to.   Follow-up care is a key part of your treatment and safety. Be sure to make and go to all appointments, and call your doctor if you are having problems. It's also a good idea to know your test results and keep a list of the medicines you take. When should you call for help? Call 911 anytime you think you may need emergency care. For example, call if:  · You passed out (lost consciousness). · You pass maroon or bloody stools. · You have severe belly pain. Call your doctor now or seek immediate medical care if:  · Your stools are black and tarlike. · Your stools have streaks of blood, but you did not have a biopsy or any polyps removed. · You have belly pain, or your belly is swollen and firm. · You vomit. · You have a fever. · You are very dizzy. Watch closely for changes in your health, and be sure to contact your doctor if you have any problems. Where can you learn more? Go to Ya[employer]  Enter E264 in the search box to learn more about \"Colonoscopy: What to Expect at Home. \"   © 4187-3493 Healthwise, Incorporated. Care instructions adapted under license by [facility] (which disclaims liability or warranty for this information). This care instruction is for use with your licensed healthcare professional. If you have questions about a medical condition or this instruction, always ask your healthcare professional. [place] [address] any warranty or liability for your use of this information. Content Version: 38.2.595526; Current as of: November 14, 2014      DISCHARGE SUMMARY from Nurse     POST-PROCEDURE INSTRUCTIONS:    Call your Physician if you:  ? Observe any excess bleeding. ? Develop a temperature over 100.5o F.  ? Experience abdominal, shoulder or chest pain. ? Notice any signs of decreased circulation or nerve impairment to an extremity such as a change in color, persistent numbness, tingling, coldness or increase in pain. ?  Vomit blood or you have nausea and vomiting lasting longer than 4 hours. ? Are unable to take medications. ? Are unable to urinate within 8 hours after discharge following general anesthesia or intravenous sedation. For the next 24 hours after receiving general anesthesia or intravenous sedation, or while taking prescription Narcotics, limit your activities:  ? Do NOT drive a motor vehicle, operate hazard machinery or power tools, or perform tasks that require coordination. The medication you received during your procedure may have some effect on your mental awareness. ? Do NOT make important personal or business decisions. The medication you received during your procedure may have some effect on your mental awareness. ? Do NOT drink alcoholic beverages. These drinks do not mix well with the medications that have been given to you. ? Upon discharge from the hospital, you must be accompanied by a responsible adult. ? Resume your diet as directed by your physician. ? Resume medications as your physician has prescribed. ? Please give a list of your current medications to your Primary Care Provider. ? Please update this list whenever your medications are discontinued, doses are changed, or new medications (including over-the-counter products) are added. ? Please carry medication information at all times in case of emergency situations. These are general instructions for a healthy lifestyle:    No smoking/ No tobacco products/ Avoid exposure to second hand smoke.  Surgeon General's Warning:  Quitting smoking now greatly reduces serious risk to your health. Obesity, smoking, and a sedentary lifestyle greatly increase your risk for illness.    A healthy diet, regular physical exercise & weight monitoring are important for maintaining a healthy lifestyle   You may be retaining fluid if you have a history of heart failure or if you experience any of the following symptoms:  Weight gain of 3 pounds or more overnight or 5 pounds in a week, increased swelling in our hands or feet or shortness of breath while lying flat in bed. Please call your doctor as soon as you notice any of these symptoms; do not wait until your next office visit. Recognize signs and symptoms of STROKE:  F  -  Face looks uneven  A  -  Arms unable to move or move unevenly  S  -  Speech slurred or non-existent  T  -  Time to call 911 - as soon as signs and symptoms begin - DO NOT go back to bed or wait to see If you get better - TIME IS BRAIN. Colorectal Screening   Colorectal cancer almost always develops from precancerous polyps (abnormal growths) in the colon or rectum. Screening tests can find precancerous polyps, so that they can be removed before they turn into cancer. Screening tests can also find colorectal cancer early, when treatment works best.  Deanna Guerin Speak with your physician about when you should begin screening and how often you should be tested. Additional Information    If you have questions, please call [phone]. Remember, [facility] is NOT to be used for urgent needs. For medical emergencies, dial 911. APPOINTMENTS:    Please make a follow-up appointment with your physician. Discharge information has been reviewed with the patient. The patient verbalized understanding.

## 2022-03-16 PROBLEM — I10 ESSENTIAL HYPERTENSION: Status: ACTIVE | Noted: 2020-03-13

## 2022-03-16 PROBLEM — M51.36 DEGENERATION OF INTERVERTEBRAL DISC OF LUMBAR REGION: Status: ACTIVE | Noted: 2020-03-13

## 2022-03-16 PROBLEM — I69.30 HISTORY OF CVA WITH RESIDUAL DEFICIT: Status: ACTIVE | Noted: 2020-03-13

## 2022-03-16 PROBLEM — L84 CORNS AND CALLOSITIES: Status: ACTIVE | Noted: 2020-03-13

## 2022-03-16 PROBLEM — C67.9 MALIGNANT NEOPLASM OF URINARY BLADDER (HCC): Status: ACTIVE | Noted: 2021-12-02

## 2022-03-16 PROBLEM — H26.9 CATARACT: Status: ACTIVE | Noted: 2020-07-10

## 2022-03-16 PROBLEM — G47.9 DIFFICULTY SLEEPING: Status: ACTIVE | Noted: 2020-03-13

## 2022-03-16 PROBLEM — M54.16 LUMBAR RADICULOPATHY: Status: ACTIVE | Noted: 2020-04-27

## 2022-03-16 PROBLEM — I69.90 LATE EFFECTS OF CEREBROVASCULAR DISEASE: Status: ACTIVE | Noted: 2020-04-27

## 2022-03-16 PROBLEM — F32.9 MAJOR DEPRESSION, SINGLE EPISODE: Status: ACTIVE | Noted: 2020-09-25

## 2022-03-16 PROBLEM — N13.8 BPH WITH OBSTRUCTION/LOWER URINARY TRACT SYMPTOMS: Status: ACTIVE | Noted: 2020-03-13

## 2022-03-16 PROBLEM — R42 DIZZINESS AND GIDDINESS: Status: ACTIVE | Noted: 2020-04-27

## 2022-03-16 PROBLEM — R53.82 CHRONIC FATIGUE: Status: ACTIVE | Noted: 2020-03-13

## 2022-03-16 PROBLEM — R26.9 ABNORMALITY OF GAIT AND MOBILITY: Status: ACTIVE | Noted: 2020-03-13

## 2022-03-16 PROBLEM — M19.90 OSTEOARTHROSIS: Status: ACTIVE | Noted: 2020-04-27

## 2022-03-16 PROBLEM — F41.9 ANXIETY DISORDER: Status: ACTIVE | Noted: 2020-04-27

## 2022-03-16 PROBLEM — M20.11 VALGUS DEFORMITY OF BOTH GREAT TOES: Status: ACTIVE | Noted: 2020-03-13

## 2022-03-16 PROBLEM — J84.10 PULMONARY FIBROSIS (HCC): Status: ACTIVE | Noted: 2022-03-16

## 2022-03-16 PROBLEM — N13.9 OBSTRUCTIVE UROPATHY: Status: ACTIVE | Noted: 2022-03-16

## 2022-03-16 PROBLEM — E78.5 HYPERLIPIDEMIA: Status: ACTIVE | Noted: 2020-03-13

## 2022-03-16 PROBLEM — F33.0 MILD EPISODE OF RECURRENT MAJOR DEPRESSIVE DISORDER (HCC): Status: ACTIVE | Noted: 2020-03-13

## 2022-03-16 PROBLEM — N40.1 LOWER URINARY TRACT SYMPTOMS DUE TO BENIGN PROSTATIC HYPERPLASIA: Status: ACTIVE | Noted: 2022-03-16

## 2022-03-16 PROBLEM — M20.12 VALGUS DEFORMITY OF BOTH GREAT TOES: Status: ACTIVE | Noted: 2020-03-13

## 2022-03-16 PROBLEM — H25.13 AGE-RELATED NUCLEAR CATARACT OF BOTH EYES: Status: ACTIVE | Noted: 2020-03-13

## 2022-03-16 PROBLEM — G47.33 OBSTRUCTIVE SLEEP APNEA SYNDROME: Status: ACTIVE | Noted: 2020-03-13

## 2022-03-16 PROBLEM — N40.1 BPH WITH OBSTRUCTION/LOWER URINARY TRACT SYMPTOMS: Status: ACTIVE | Noted: 2020-03-13

## 2022-03-16 PROBLEM — E11.65 HYPERGLYCEMIA DUE TO TYPE 2 DIABETES MELLITUS (HCC): Status: ACTIVE | Noted: 2019-01-08

## 2022-03-16 PROBLEM — R20.2 PARESTHESIA: Status: ACTIVE | Noted: 2020-03-13

## 2022-03-19 PROBLEM — E78.5 HYPERLIPIDEMIA: Status: ACTIVE | Noted: 2020-03-13

## 2022-03-19 PROBLEM — Z12.5 PROSTATE CANCER SCREENING: Status: ACTIVE | Noted: 2018-06-20

## 2022-03-19 PROBLEM — E11.40 TYPE 2 DIABETES MELLITUS WITH DIABETIC NEUROPATHY (HCC): Status: ACTIVE | Noted: 2018-11-14

## 2022-03-19 PROBLEM — I10 ESSENTIAL HYPERTENSION: Status: ACTIVE | Noted: 2020-03-13

## 2022-03-24 PROBLEM — G47.9 DIFFICULTY SLEEPING: Status: ACTIVE | Noted: 2020-03-13

## 2022-03-24 PROBLEM — C67.9 MALIGNANT NEOPLASM OF URINARY BLADDER (HCC): Status: ACTIVE | Noted: 2021-12-02

## 2022-03-24 PROBLEM — N13.8 BPH WITH OBSTRUCTION/LOWER URINARY TRACT SYMPTOMS: Status: ACTIVE | Noted: 2020-03-13

## 2022-03-24 PROBLEM — R53.82 CHRONIC FATIGUE: Status: ACTIVE | Noted: 2020-03-13

## 2022-03-24 PROBLEM — H25.13 AGE-RELATED NUCLEAR CATARACT OF BOTH EYES: Status: ACTIVE | Noted: 2020-03-13

## 2022-03-24 PROBLEM — H26.9 CATARACT: Status: ACTIVE | Noted: 2020-07-10

## 2022-03-24 PROBLEM — M54.16 LUMBAR RADICULOPATHY: Status: ACTIVE | Noted: 2020-04-27

## 2022-03-24 PROBLEM — F41.9 ANXIETY DISORDER: Status: ACTIVE | Noted: 2020-04-27

## 2022-03-24 PROBLEM — F32.9 MAJOR DEPRESSION, SINGLE EPISODE: Status: ACTIVE | Noted: 2020-09-25

## 2022-03-24 PROBLEM — M19.90 OSTEOARTHROSIS: Status: ACTIVE | Noted: 2020-04-27

## 2022-03-24 PROBLEM — L84 CORNS AND CALLOSITIES: Status: ACTIVE | Noted: 2020-03-13

## 2022-03-24 PROBLEM — G47.33 OBSTRUCTIVE SLEEP APNEA SYNDROME: Status: ACTIVE | Noted: 2020-03-13

## 2022-03-24 PROBLEM — R26.9 ABNORMALITY OF GAIT AND MOBILITY: Status: ACTIVE | Noted: 2020-03-13

## 2022-03-24 PROBLEM — R42 DIZZINESS AND GIDDINESS: Status: ACTIVE | Noted: 2020-04-27

## 2022-03-24 PROBLEM — J84.10 PULMONARY FIBROSIS (HCC): Status: ACTIVE | Noted: 2022-03-16

## 2022-03-24 PROBLEM — R20.2 PARESTHESIA: Status: ACTIVE | Noted: 2020-03-13

## 2022-03-24 PROBLEM — M51.36 DEGENERATION OF INTERVERTEBRAL DISC OF LUMBAR REGION: Status: ACTIVE | Noted: 2020-03-13

## 2022-03-24 PROBLEM — E11.65 HYPERGLYCEMIA DUE TO TYPE 2 DIABETES MELLITUS (HCC): Status: ACTIVE | Noted: 2019-01-08

## 2022-03-24 PROBLEM — I69.90 LATE EFFECTS OF CEREBROVASCULAR DISEASE: Status: ACTIVE | Noted: 2020-04-27

## 2022-03-24 PROBLEM — N13.9 OBSTRUCTIVE UROPATHY: Status: ACTIVE | Noted: 2022-03-16

## 2022-03-24 PROBLEM — N40.1 BPH WITH OBSTRUCTION/LOWER URINARY TRACT SYMPTOMS: Status: ACTIVE | Noted: 2020-03-13

## 2022-03-24 PROBLEM — M20.12 VALGUS DEFORMITY OF BOTH GREAT TOES: Status: ACTIVE | Noted: 2020-03-13

## 2022-03-24 PROBLEM — F33.0 MILD EPISODE OF RECURRENT MAJOR DEPRESSIVE DISORDER (HCC): Status: ACTIVE | Noted: 2020-03-13

## 2022-03-24 PROBLEM — M20.11 VALGUS DEFORMITY OF BOTH GREAT TOES: Status: ACTIVE | Noted: 2020-03-13

## 2022-03-24 PROBLEM — N40.1 LOWER URINARY TRACT SYMPTOMS DUE TO BENIGN PROSTATIC HYPERPLASIA: Status: ACTIVE | Noted: 2022-03-16

## 2022-03-24 PROBLEM — I69.30 HISTORY OF CVA WITH RESIDUAL DEFICIT: Status: ACTIVE | Noted: 2020-03-13

## 2022-06-20 ENCOUNTER — TELEPHONE (OUTPATIENT)
Dept: PHYSICAL THERAPY | Age: 77
End: 2022-06-20

## 2022-06-20 ENCOUNTER — HOSPITAL ENCOUNTER (OUTPATIENT)
Dept: PHYSICAL THERAPY | Age: 77
Discharge: HOME | End: 2022-06-20
Payer: MEDICARE

## 2022-06-20 PROCEDURE — 97161 PT EVAL LOW COMPLEX 20 MIN: CPT

## 2022-06-20 PROCEDURE — 97110 THERAPEUTIC EXERCISES: CPT

## 2022-06-20 PROCEDURE — 97530 THERAPEUTIC ACTIVITIES: CPT

## 2022-06-20 NOTE — PROGRESS NOTES
[facility] - [facility]              [address]        [place], [address] [place]   [phone] fax    Plan of Care/ Statement of Necessity for Physical Therapy Services    Patient name: Velia Wheeelr Start of Care: 2022   Referral source: Dale Chapmna MD : 1945    Medical Diagnosis: Other abnormalities of gait and mobility [R26.89]  Payor: Veda Swanson / Plan: [place] MED[place]ARE PART A & B / Product Type: Medicare /  Onset Date:    Treatment Diagnosis: Gait Unsteadiness   Prior Hospitalization: see medical history Provider#: [id number]   Medications: Verified on Patient summary List    Comorbidities: Diabetes Type II, CVA (), Arthritis    Prior Level of Function: Works Part-Time, No Fall History, (I) Functional ADLs, (I) Self-Care ADLs      The Plan of Care and following information is based on the information from the initial evaluation. Assessment:    Patient presents with chronic gait unsteadiness with PMH significant for CVA () and subjective chronic left-sided weakness and diabetes mellitus. Objectively patient noted to demonstrate LE strength WFL and normal oculomotor examine. At this time to place patient on hold with provision of home exercise program, with skilled physical therapy services not deemed medically appropriate secondary to lack of significant functional limitations nor activity restrictions (FOTO = 76). Patient denies significant functional limitations secondary to symptom presentation with patient continuing to work part-time, exercising regularly and denying fall history nor fear of falling. Patient does subjectively report sense of anterior loss of balance with leaning forward but denies limitations secondary to this nor history of falls. Patient and daughter educated to monitor and document symptoms to allow for better understanding with regards to potential correlation to activity and vitals.  Patient educated to contact clinic if PT services deemed medically necessary with patient at this time to be placed on 30 day hold with skilled physical therapy services not deemed medically appropriate.      Patient will continue to benefit from skilled PT services to modify and progress therapeutic interventions, address functional mobility deficits, address strength deficits, analyze and address soft tissue restrictions, analyze and cue movement patterns, analyze and modify body mechanics/ergonomics, assess and modify postural abnormalities, address imbalance/dizziness and instruct in home and community integration to attain remaining goals. Key Information:    Orthostatic BP: Seated 110/60 mmHg / Standing 120/65 mmHg     Strength: [x]? WFL    []? Limited    Except: Right Hallux Ext 3/5, Left Hip Flexion 4+/5     Optional Tests:  Sensation:      []? Intact          [x]? Diminished    Describe: Hyposensitivity L2-S2  Protective Sensation (5.07 microfilament; plantar aspect of feet): Left (loss at plantar aspect of plantar aspect of medial arch and hallux) / Right (loss at plantar aspect of hallux)  Reflexes:  1+ L/R Patellar, 0+ L/R Achilles     Oculomotor Tests: (Fixation Not Blocked)       Ocular ROM:                                  [x]? [facility]   []? Limited       Describe: Questionable mid-range nystagmus all directions w/o sxs       Spontaneous Nystag. [x]? Neg     []? Pos    []? Left    []? Right       Gaze Holding Nystag. [x]? Neg     []? Pos    []? Left    []? Right        Smooth Pursuit                              [x]? Neg     []? Pos    []? Left    []? Right        VOR - Slow Head Mvmt                [x]? Neg     []? Pos    []? Left    []? Right        VOR - Fast Head Mvmt                 [x]? Neg     []? Pos    []? Left    []? Right        Head Thrust                                   [x]? Neg     []? Pos    []? Left    []?  Right      Balance Standard Testing (Eyes Open/Eyes Closed - EO/EC) Arnol (Airex):  EO 30 sec / EC 30 sec                 Single Leg Stance:             Left SLS 5 sec / Right SLS 5 sec    Evaluation Complexity History MEDIUM  Complexity : 1-2 comorbidities / personal factors will impact the outcome/ POC ; Examination LOW Complexity : 1-2 Standardized tests and measures addressing body structure, function, activity limitation and / or participation in recreation  ;Presentation LOW Complexity : Stable, uncomplicated  ;Clinical Decision Making LOW Complexity : FOTO score of   Overall Complexity Rating: LOW   Problem List: decrease strength, impaired gait/ balance, decrease ADL/ functional abilitiies, decrease activity tolerance, decrease flexibility/ joint mobility and decrease transfer abilities   Treatment Plan may include any combination of the following: Therapeutic exercise, Therapeutic activities, Neuromuscular re-education, Physical agent/modality, Gait/balance training, Manual therapy, Patient education, Self Care training, Functional mobility training, Home safety training and Stair training  Patient / Family readiness to learn indicated by: asking questions, trying to perform skills and interest  Persons(s) to be included in education: patient (P)  Barriers to Learning/Limitations: None  Patient Goal (s): Stable walking  Patient Self Reported Health Status: good  Rehabilitation Potential: good    Short Term Goals: To be accomplished in 4 treatments:  1. Patient will subjectively report full compliance with prescribed HEP. Eval: HEP provided  2. Patient will demonstrate a significant functional improvement as demonstrated by a score of >/= 77 on FOTO. Eval: FOTO = 76       3. Patient will demonstrate left/right SLS >/= 20 seconds to demonstrate a reduced falls risk.   Eval: Left SLS 5 sec / Right SLS 5 sec    **Goals to be reassessed and updated if skilled physical therapy services deemed medically appropriate. Frequency / Duration: Patient to be placed on 30 day hold with skilled physical therapy services not deemed medically appropriate at this time secondary to lack of significant physical impairments nor functional limitations. If skilled physical therapy services deemed medically appropriate, patient to be seen 1 times per week for 4 weeks. Patient/ Caregiver education and instruction: Diagnosis, prognosis, self care, activity modification and exercises   [x]  Plan of care has been reviewed with PTA      Certification Period: 6/20/2022 - 7/19/2022  Bogdan Roman, PT 6/20/2022 9:57 AM  ________________________________________________________________________    I certify that the above Therapy Services are being furnished while the patient is under my care. I agree with the treatment plan and certify that this therapy is necessary.     Physician's Signature:____________Date:_________TIME:________     Bebeto Lake MD  ** Signature, Date and Time must be completed for valid certification **  Please sign and return to [facility] - [address], [address] [facility]   [phone] fax

## 2022-06-20 NOTE — PROGRESS NOTES
PT DAILY TREATMENT NOTE     Patient Name: Hali Munoz  Date:2022  : 1945  [x]  Patient  Verified  Payor: Ashley Galan / Plan: [place] MEDICARE PART A & B / Product Type: Medicare /    In time:418  Out time:515  Total Treatment Time (min): 62  Visit #: 1 of 4    Medicare/BCBS Only   Total Timed Codes (min):  23 1:1 Treatment Time:  57       Treatment Area: Other abnormalities of gait and mobility [R26.89]    SUBJECTIVE    Physical Therapy Evaluation - Vestibular    Any medication changes, allergies to medications, adverse drug reactions, diagnosis change, or new procedure performed?: [x] No    [] Yes (see summary sheet for update)    Subjective functional status/changes:     PLOF: Works Part-Time, No Fall History, (I) Functional ADLs, (I) Self-Care ADLs  Current Functional Status: Difficulty with work-related ADLs, Difficulty with household ADLs  Work Hx: [employer] - Self-Employed ([employer] - 16-24 hours/week)  Living Situation: Lives with family  Comorbidities: Diabetes Type II, CVA (), Arthritis     Subjective: Patient presents to evaluation with daughter. Patient presents with non-vertiginous dizziness with patient reporting onset of dizziness and left-sided weakness and tingling since CVA . Patient denies use of AD. Patient does report noting anterior loss of balance with playing with grandchildren. Patient reports noting chronic left LE weakness. Patient does report episodes of visual floaters but denies changes in vision/hearing. Patient reports paying minimal attention to dizziness with patient unable to report 24 hour symptom pattern. Patient unable to report pattern with regards to symptom frequency and duration. Patient reports no specific aggravators. Patient does not take BP but does report monitoring glucose regularly. Pain Intensity (0-10, VAS): Current 0, Worst 0, Best 0    Patient Goals: \"Stable walking. \"     OBJECTIVE EXAMINATION    Orthostatic BP: Seated 110/60 mmHg / Standing 120/65 mmHg    Strength: [x] WFL    [] Limited    Except: Right Hallux Ext 3/5, Left Hip Flexion 4+/5    Optional Tests:  Sensation:  [] Intact [x] Diminished    Describe: Hyposensitivity L2-S2  Protective Sensation (5.07 microfilament; plantar aspect of feet): Left (loss at plantar aspect of plantar aspect of medial arch and hallux) / Right (loss at plantar aspect of hallux)  Reflexes:  1+ L/R Patellar, 0+ L/R Achilles    Oculomotor Tests: (Fixation Not Blocked)       Ocular ROM:    [x] WFL   [] Limited    Describe: Questionable mid-range nystagmus all directions w/o sxs       Spontaneous Nystag. [x] Neg     [] Pos    [] Left    [] Right       Gaze Holding Nystag.   [x] Neg     [] Pos    [] Left    [] Right        Smooth Pursuit   [x] Neg     [] Pos    [] Left    [] Right        VOR - Slow Head Mvmt  [x] Neg     [] Pos    [] Left    [] Right        VOR - Fast Head Mvmt  [x] Neg     [] Pos    [] Left    [] Right        Head Thrust   [x] Neg     [] Pos    [] Left    [] Right     Balance Standard Testing (Eyes Open/Eyes Closed - EO/EC)            [place] (Airex):  EO 30 sec / EC 30 sec        Single Leg Stance:  Left SLS 5 sec / Right SLS 5 sec    OBJECTIVE    34 min [x]Eval                  []Re-Eval     8 min Therapeutic Exercise:  [x] See flow sheet : Patient educated regarding completion of prescribed HEP and provided with written HEP instructions, Patient educated regarding diagnosis and PT POC   Rationale: increase ROM and increase strength to improve the patients ability to improve ease with functional ADLs    15 min Therapeutic Activity:  [x]  See flow sheet : Review regarding importance of logging symptoms to assess for correlation of symptoms to BP and glucose measurements, Education regarding fall prevention, Education regarding importance of maintaining activity level and fitness   Rationale: increase ROM, increase strength, improve balance and increase proprioception  to improve the patients ability to decrease falls risk       With   [] TE   [] TA   [] neuro   [] other: Patient Education: [x] Review HEP    [] Progressed/Changed HEP based on:   [] positioning   [] body mechanics   [] transfers   [] heat/ice application    [] other:      Other Objective/Functional Measures: See objective above. Pain Level (0-10 scale) post treatment: 0    ASSESSMENT/Changes in Function: Patient presents with chronic gait unsteadiness with PMH significant for CVA (2012) and subjective chronic left-sided weakness and diabetes mellitus. Objectively patient noted to demonstrate LE strength WFL and normal oculomotor examine. At this time to place patient on hold with provision of home exercise program, with skilled physical therapy services not deemed medically appropriate secondary to lack of significant functional limitations nor activity restrictions (FOTO = 76). Patient denies significant functional limitations secondary to symptom presentation with patient continuing to work part-time, exercising regularly and denying fall history nor fear of falling. Patient does subjectively report sense of anterior loss of balance with leaning forward but denies limitations secondary to this nor history of falls. Patient and daughter educated to monitor and document symptoms to allow for better understanding with regards to potential correlation to activity and vitals. Patient educated to contact clinic if PT services deemed medically necessary with patient at this time to be placed on 30 day hold with skilled physical therapy services not deemed medically appropriate.      Patient will continue to benefit from skilled PT services to modify and progress therapeutic interventions, address functional mobility deficits, address strength deficits, analyze and address soft tissue restrictions, analyze and cue movement patterns, analyze and modify body mechanics/ergonomics, assess and modify postural abnormalities, address imbalance/dizziness and instruct in home and community integration to attain remaining goals. [x]  See Plan of Care  []  See progress note/recertification  []  See Discharge Summary         Progress towards goals / Updated goals:    Short Term Goals: To be accomplished in 4 treatments:  1. Patient will subjectively report full compliance with prescribed HEP. Eval: HEP provided  2. Patient will demonstrate a significant functional improvement as demonstrated by a score of >/= 77 on FOTO. Eval: FOTO = 76       3. Patient will demonstrate left/right SLS >/= 20 seconds to demonstrate a reduced falls risk. Eval: Left SLS 5 sec / Right SLS 5 sec    **Goals to be reassessed and updated if skilled physical therapy services deemed medically appropriate.     PLAN  [x]  Upgrade activities as tolerated     []  Continue plan of care  []  Update interventions per flow sheet       []  Discharge due to:_  []  Other:_      Nathan Santos, PT 6/20/2022  9:55 AM    Future Appointments   Date Time Provider Florencia Nunez   6/20/2022  4:15 PM Verna Briseno 89, [address] [facility]   3/16/2023  1:00 PM Jian Capone MD [id number] [facility]

## 2022-07-20 NOTE — PROGRESS NOTES
[facility] - [facility]              [address] [place], [address] [place]   [phone] fax      Discharge Summary  Patient name: Prosper Ponce Start of Care: 2022   Referral source: Nancy Garcia MD : 1945   Medical/Treatment Diagnosis: Other abnormalities of gait and mobility [R26.89]  Payor: Evan Weber / Plan: [place] MEDICARE PART A & B / Product Type: Medicare /  Onset Date:     Prior Hospitalization: see medical history Provider#: [id number]   Medications: Verified on Patient Summary List    Comorbidities: Diabetes Type II, CVA (), Arthritis    Prior Level of Function: Works Part-Time, No Fall History, (I) Functional ADLs, (I) Self-Care ADLs  Visits from Start of Care: 1    Missed Visits: 0  Reporting Period : 2022 to 2022      Summary of Care:    Short Term Goals: To be accomplished in 4 treatments:  1. Patient will subjectively report full compliance with prescribed HEP. Eval: HEP provided  At DC: Remains, Inability to assess secondary to non-attendance post-evaluation  2. Patient will demonstrate a significant functional improvement as demonstrated by a score of >/= 77 on FOTO. Eval: FOTO = 76    At DC: Remains, Inability to assess secondary to non-attendance post-evaluation     3. Patient will demonstrate left/right SLS >/= 20 seconds to demonstrate a reduced falls risk. Eval: Left SLS 5 sec / Right SLS 5 sec  At DC: Remains, Inability to assess secondary to non-attendance post-evaluation     ASSESSMENT/RECOMMENDATIONS:    At this time patient to be discharged in accordance with clinic 30 day policy with patient having last been seen within clinic 2022. At time of evaluation skilled physical therapy services not deemed medically appropriate secondary to lack of significant physical impairments nor functional limitations.  Patient provided with HEP at evaluation and without further contact with clinic over 30 day hold period. [x]Discontinue therapy: []Patient has reached or is progressing toward set goals      [x]Patient not a candidate for skilled physical therapy services.       []Due to lack of appreciable progress towards set goals    Rufus Dorado, PT 7/20/2022 12:44 PM

## 2022-11-10 ENCOUNTER — OFFICE VISIT (OUTPATIENT)
Dept: CARDIOLOGY CLINIC | Age: 77
End: 2022-11-10
Payer: MEDICARE

## 2022-11-10 VITALS
OXYGEN SATURATION: 98 % | SYSTOLIC BLOOD PRESSURE: 128 MMHG | WEIGHT: 198 LBS | DIASTOLIC BLOOD PRESSURE: 64 MMHG | HEIGHT: 73 IN | BODY MASS INDEX: 26.24 KG/M2 | HEART RATE: 68 BPM

## 2022-11-10 DIAGNOSIS — I69.30 HISTORY OF CVA WITH RESIDUAL DEFICIT: ICD-10-CM

## 2022-11-10 DIAGNOSIS — R42 DISEQUILIBRIUM: ICD-10-CM

## 2022-11-10 DIAGNOSIS — E11.8 TYPE 2 DIABETES MELLITUS WITH COMPLICATIONS (HCC): ICD-10-CM

## 2022-11-10 DIAGNOSIS — R55 NEAR SYNCOPE: ICD-10-CM

## 2022-11-10 DIAGNOSIS — R00.0 TACHYCARDIA: Primary | ICD-10-CM

## 2022-11-10 DIAGNOSIS — I10 ESSENTIAL HYPERTENSION: ICD-10-CM

## 2022-11-10 DIAGNOSIS — R00.2 PALPITATIONS: ICD-10-CM

## 2022-11-10 PROCEDURE — G8427 DOCREV CUR MEDS BY ELIG CLIN: HCPCS | Performed by: INTERNAL MEDICINE

## 2022-11-10 PROCEDURE — G8536 NO DOC ELDER MAL SCRN: HCPCS | Performed by: INTERNAL MEDICINE

## 2022-11-10 PROCEDURE — G9717 DOC PT DX DEP/BP F/U NT REQ: HCPCS | Performed by: INTERNAL MEDICINE

## 2022-11-10 PROCEDURE — 99204 OFFICE O/P NEW MOD 45 MIN: CPT | Performed by: INTERNAL MEDICINE

## 2022-11-10 PROCEDURE — G8754 DIAS BP LESS 90: HCPCS | Performed by: INTERNAL MEDICINE

## 2022-11-10 PROCEDURE — 1101F PT FALLS ASSESS-DOCD LE1/YR: CPT | Performed by: INTERNAL MEDICINE

## 2022-11-10 PROCEDURE — 3078F DIAST BP <80 MM HG: CPT | Performed by: INTERNAL MEDICINE

## 2022-11-10 PROCEDURE — 1123F ACP DISCUSS/DSCN MKR DOCD: CPT | Performed by: INTERNAL MEDICINE

## 2022-11-10 PROCEDURE — 93000 ELECTROCARDIOGRAM COMPLETE: CPT | Performed by: INTERNAL MEDICINE

## 2022-11-10 PROCEDURE — G8417 CALC BMI ABV UP PARAM F/U: HCPCS | Performed by: INTERNAL MEDICINE

## 2022-11-10 PROCEDURE — G8752 SYS BP LESS 140: HCPCS | Performed by: INTERNAL MEDICINE

## 2022-11-10 PROCEDURE — 3074F SYST BP LT 130 MM HG: CPT | Performed by: INTERNAL MEDICINE

## 2022-11-10 RX ORDER — AA/PROT/LYSINE/METHIO/VIT C/B6 50-12.5 MG
TABLET ORAL DAILY
COMMUNITY

## 2022-11-10 RX ORDER — FLUTICASONE PROPIONATE 50 MCG
2 SPRAY, SUSPENSION (ML) NASAL 2 TIMES DAILY
COMMUNITY
Start: 2022-04-27

## 2022-11-10 NOTE — PROGRESS NOTES
Marleny Hand presents today for   Chief Complaint   Patient presents with    New Patient     Referred by pcp for tachycardia and dizziness       Marleny Hand preferred language for health care discussion is english/other. Is someone accompanying this pt? yes    Is the patient using any DME equipment during [address]? no    Depression Screening:  3 most recent PHQ Screens 11/10/2022   Little interest or pleasure in doing things Not at all   Feeling down, depressed, irritable, or hopeless Not at all   Total Score PHQ 2 0       Learning Assessment:  Learning Assessment 11/10/2022   PRIMARY LEARNER Patient   HIGHEST LEVEL OF EDUCATION - PRIMARY LEARNER  -   BARRIERS PRIMARY LEARNER -   [address] -    NEED -   LEARNER PREFERENCE PRIMARY DEMONSTRATION   ANSWERED BY patient   RELATIONSHIP SELF       Abuse Screening:  Abuse Screening Questionnaire 11/10/2022   Do you ever feel afraid of your partner? N   Are you in a relationship with someone who physically or mentally threatens you? N   Is it safe for you to go home? Y       Fall Risk  Fall Risk Assessment, last 12 mths 11/10/2022   Able to walk? Yes   Fall in past 12 months? 0   Do you feel unsteady? 0   Are you worried about falling 0   Number of falls in past 12 months -   Fall with injury? -           Pt currently taking Anticoagulant therapy? no    Pt currently taking Antiplatelet therapy ? no      Coordination of Care:  1. Have you been to the ER, urgent care clinic since your last visit? Hospitalized since your last visit? no    2. Have you seen or consulted any other health care providers outside of the [address] [place] [address] since your last visit? Include any pap smears or colon screening.  no
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
## 2023-02-06 DIAGNOSIS — R00.2 PALPITATIONS: ICD-10-CM

## 2023-02-06 DIAGNOSIS — R00.0 TACHYCARDIA, UNSPECIFIED: Primary | ICD-10-CM

## 2023-02-27 ENCOUNTER — TELEPHONE (OUTPATIENT)
Age: 78
End: 2023-02-27

## 2023-02-27 NOTE — TELEPHONE ENCOUNTER
Patient's daughter made aware of echo results and Dr. Richelle Roblero remarks. No questions or concerns at present.

## 2023-02-27 NOTE — TELEPHONE ENCOUNTER
----- Message from Alayna Sparks MD sent at 2/27/2023  8:03 AM EST -----  Please let the patient know that his echocardiogram was normal.  ----- Message -----  From: Thaddeus Olmedo LPN  Sent: 5/19/7304   4:05 PM EST  To: Alayna Sparks MD    Per your note-Tachycardia/heart palpitations. This appears to have resolved over the past few weeks. He feels this was more present when he was feeling poorly for approximately 4 weeks earlier this fall. He feels he may have had a COVID-19 infection. For completeness, have recommended checking an echocardiogram to evaluate for any structural heart disease. If this is unremarkable, no additional cardiac testing needed.

## 2023-03-19 PROBLEM — G47.33 OBSTRUCTIVE SLEEP APNEA SYNDROME: Status: ACTIVE | Noted: 2020-03-13

## 2023-03-19 PROBLEM — I10 BENIGN ESSENTIAL HYPERTENSION: Status: ACTIVE | Noted: 2020-03-13

## 2023-03-19 PROBLEM — E78.5 HYPERLIPIDEMIA DUE TO TYPE 2 DIABETES MELLITUS (HCC): Status: ACTIVE | Noted: 2019-01-08

## 2023-03-19 PROBLEM — I69.30 HISTORY OF CVA WITH RESIDUAL DEFICIT: Status: ACTIVE | Noted: 2020-03-13

## 2023-03-19 PROBLEM — E11.69 HYPERLIPIDEMIA DUE TO TYPE 2 DIABETES MELLITUS (HCC): Status: ACTIVE | Noted: 2019-01-08

## 2023-03-19 PROBLEM — E78.2 MIXED HYPERLIPIDEMIA: Status: ACTIVE | Noted: 2020-03-13

## 2023-03-19 PROBLEM — M51.36 DDD (DEGENERATIVE DISC DISEASE), LUMBAR: Status: ACTIVE | Noted: 2020-03-13

## 2023-03-19 PROBLEM — B02.9 HERPES ZOSTER: Status: ACTIVE | Noted: 2023-03-19

## 2023-10-29 NOTE — TELEPHONE ENCOUNTER
Requested Prescriptions     Pending Prescriptions Disp Refills    empagliflozin (JARDIANCE) 10 mg tablet 90 Tab 1     Sig: TAKE 1 TABLET BY MOUTH EVERY MORNING     90 day supply requested by insurance Jacynth

## 2024-04-23 ENCOUNTER — HOSPITAL ENCOUNTER (OUTPATIENT)
Facility: HOSPITAL | Age: 79
Setting detail: RECURRING SERIES
Discharge: HOME | End: 2024-04-26
Payer: MEDICARE

## 2024-04-23 PROCEDURE — 97161 PT EVAL LOW COMPLEX 20 MIN: CPT

## 2024-04-23 PROCEDURE — 97535 SELF CARE MNGMENT TRAINING: CPT

## 2024-04-23 PROCEDURE — 97530 THERAPEUTIC ACTIVITIES: CPT

## 2024-04-23 PROCEDURE — 97112 NEUROMUSCULAR REEDUCATION: CPT

## 2024-04-23 NOTE — PROGRESS NOTES
[facility] [place]  [address], [place]  [address]  Phone: [phone]      Fax:  [phone]    Plan of Care/ Statement of Necessity for Physical Therapy Services    Patient name: eNw Hinton Start of Care: 2024   Referral source: Carmelo Alvarez MD : 1945    Medical Diagnosis: Dizziness and giddiness [R42]       Onset Date:23    Treatment Diagnosis:      R42   Dizziness and giddiness                           Prior Hospitalization: see medical history Provider#: [id number]   Medications: Verified on Patient Summary List     Comorbidities:  CVA affecting left side, DM, left eye glacoma,   Prior Level of Function: full function without dizziness or LOB    The Plan of Care and following information is based on the information from the initial evaluation.  Assessment/ key information: Pt is a 79yo male presenting to therapy with c/c dizziness describing as LOB and unsteadiness ongoing for the last year with insidious onset. Pt reporting dizziness is constant however increases with walking, bending with noticing veering with walking. Pt needing extra time to complete ADLs due to imbalance and unsteadiness. Pt has a hx of CVA affecting left side. Pt demonstrating decreased cervical ROM, WFL gross UE and LE strength, positive VOR however not same dizziness, negative positional vertigo, FGA score 15/30 suggesting increased falls risk, poor SLS on left, and negative Romberg on foam however unsteady with NBOS EC. Suspect symptoms due to decreased balance and proprioception in left LE with increased wB noted on right LE as well as decreased cervical ROM contributing to veering with walking and ADLs. Pt would benefit from skilled PT services to address the above impairments to allow pt to complete ADLs without fear of falling.     Evaluation Complexity HistoryHIGH Complexity :3+ comorbidities / personal factors will impact the outcome/ POC  ; 
      [] Neg     [x] Pos    Score:15/30        TUG   Score:       5x sit to stand   Score:        Fukuda step test  [] Neg     [] Pos      Balance Standard Testing (Eyes Open/Eyes Closed - EO/EC)       Romberg EO  [x] WFL    [] Pos    Describe:       Romberg EC    [x] WFL    [] Pos    Describe:           Foam WBOS  [x] WFL    [] Pos    Describe:         Foam NBOS             [] WFL    [x] Pos    Describe: instability        Sharpened Romberg       [] WFL    [] Pos    Describe:        Single Leg Stand  [] WFL    [] Pos    Describe:       Other Tests:  SLS right WFL, left 1-2 sec            Pain Level (0-10 scale) post treatment: 0    ASSESSMENT/Changes in Function: see POC    Patient will continue to benefit from skilled PT services to modify and progress therapeutic interventions, analyze and address functional mobility deficits, analyze and address ROM deficits, analyze and address strength deficits, analyze and address soft tissue restrictions, analyze and cue for proper movement patterns, analyze and modify for postural abnormalities, analyze and address imbalance/dizziness, and instruct in home and community integration to address functional deficits and attain remaining goals.       [x]  See Plan of Care for goals and assessment     PLAN  [x]  Upgrade activities as tolerated     [x]  Continue plan of care  []  Update interventions per flow sheet       []  Other:_      Kyleigh Tolliver, PT 4/23/2024  2:03 PM

## 2024-05-02 ENCOUNTER — TELEPHONE (OUTPATIENT)
Facility: HOSPITAL | Age: 79
End: 2024-05-02

## 2024-05-03 ENCOUNTER — APPOINTMENT (OUTPATIENT)
Facility: HOSPITAL | Age: 79
End: 2024-05-03
Payer: MEDICARE

## 2024-05-07 ENCOUNTER — APPOINTMENT (OUTPATIENT)
Facility: HOSPITAL | Age: 79
End: 2024-05-07
Payer: MEDICARE

## 2024-05-07 ENCOUNTER — TELEPHONE (OUTPATIENT)
Facility: HOSPITAL | Age: 79
End: 2024-05-07

## 2024-05-10 ENCOUNTER — HOSPITAL ENCOUNTER (OUTPATIENT)
Facility: HOSPITAL | Age: 79
Setting detail: RECURRING SERIES
Discharge: HOME | End: 2024-05-13
Payer: MEDICARE

## 2024-05-10 PROCEDURE — 97112 NEUROMUSCULAR REEDUCATION: CPT

## 2024-05-10 PROCEDURE — 97140 MANUAL THERAPY 1/> REGIONS: CPT

## 2024-05-10 PROCEDURE — 97110 THERAPEUTIC EXERCISES: CPT

## 2024-05-10 NOTE — PROGRESS NOTES
PHYSICAL / OCCUPATIONAL THERAPY - DAILY TREATMENT NOTE     Patient Name: New Hinton    Date: 5/10/2024    : 1945  Insurance: Payor: MEDICARE / Plan: MEDICARE PART A AND B / Product Type: *No Product type* /      Patient  verified Yes     Visit #   Current / Total 2 16   Time   In / Out 130 220   Pain   In / Out 0 0   Subjective Functional Status/Changes: Always dizzy    Changes to:  Allergies, Med Hx, Sx Hx?   no       TREATMENT AREA =  Dizziness and giddiness [R42]    OBJECTIVE      Therapeutic Procedures:  Tx Min Billable or 1:1 Min (if diff from Tx Min) Procedure, Rationale, Specifics   20 10 [id number] Therapeutic Exercise (timed):  increase ROM, strength, coordination, balance, and proprioception to improve patient's ability to progress to PLOF and address remaining functional goals. (see flow sheet as applicable)    Details if applicable:        Neuromuscular Re-Education (timed):  improve balance, coordination, kinesthetic sense, posture, core stability and proprioception to improve patient's ability to develop conscious control of individual muscles and awareness of position of extremities in order to progress to PLOF and address remaining functional goals. (see flow sheet as applicable)    Details if applicable:     10 10 02110 Manual Therapy (timed):  decrease pain, increase ROM, and increase tissue extensibility to improve patient's ability to progress to PLOF and address remaining functional goals.  The manual therapy interventions were performed at a separate and distinct time from the therapeutic activities interventions . Details: SOR, gentle cervical distraction, side glides, rib pumping with breaths       Details if applicable:           Details if applicable:            Details if applicable:     50 40 MC BC Totals Reminder: bill using total billable min of TIMED therapeutic procedures (example: do not include dry needle or estim unattended, both untimed codes, in totals to

## 2024-05-14 ENCOUNTER — APPOINTMENT (OUTPATIENT)
Facility: HOSPITAL | Age: 79
End: 2024-05-14
Payer: MEDICARE

## 2024-05-15 ENCOUNTER — HOSPITAL ENCOUNTER (OUTPATIENT)
Facility: HOSPITAL | Age: 79
Setting detail: RECURRING SERIES
Discharge: HOME | End: 2024-05-18
Payer: MEDICARE

## 2024-05-15 PROCEDURE — 97535 SELF CARE MNGMENT TRAINING: CPT

## 2024-05-15 PROCEDURE — 97110 THERAPEUTIC EXERCISES: CPT

## 2024-05-15 PROCEDURE — 97112 NEUROMUSCULAR REEDUCATION: CPT

## 2024-05-15 PROCEDURE — 97140 MANUAL THERAPY 1/> REGIONS: CPT

## 2024-05-21 ENCOUNTER — HOSPITAL ENCOUNTER (OUTPATIENT)
Facility: HOSPITAL | Age: 79
Setting detail: RECURRING SERIES
Discharge: HOME | End: 2024-05-24
Payer: MEDICARE

## 2024-05-21 PROCEDURE — 97112 NEUROMUSCULAR REEDUCATION: CPT

## 2024-05-21 PROCEDURE — 97530 THERAPEUTIC ACTIVITIES: CPT

## 2024-05-21 PROCEDURE — 97140 MANUAL THERAPY 1/> REGIONS: CPT

## 2024-05-21 PROCEDURE — 97110 THERAPEUTIC EXERCISES: CPT

## 2024-05-21 NOTE — PROGRESS NOTES
[facility] [place]  [address]  [place]  [address]  Phone: [phone]      Fax:  [phone]    Progress Note  Patient name: New Hinton Start of Care: 24   Referral source: Carmelo Alvarez MD : 1945    Medical Diagnosis: Dizziness and giddiness [R42]  Payor: MEDICARE / Plan: MEDICARE PART A AND B / Product Type: *No Product type* /  Onset Date:23    Treatment Diagnosis: R42   Dizziness and giddiness             Prior Hospitalization: see medical history Provider#: [id number]   Medications: Verified on Patient summary List   Comorbidities:CVA affecting left side, DM, left eye glacoma,   Prior Level of Function: full function without dizziness or LOB    Visits from Start of Care: 4    Missed Visits: 2    Goals/Measure of Progress: To be achieved in 6 weeks:    Short Term Goals: STG- To be accomplished in 4 week(s):  1.  Pt will be independent with HEP to encourage prophylaxis.  Eval: HEP dispensed   PN compliance per pt report goal MET 24     2. Pt left SLS will improve to > 3 seconds without LOB to demonstrate improved stability on left LE with walking  Eval: 1-2 sec   PN> 3 sec bilaterally goal MET 24     Long Term Goals: LTG- To be accomplished in 8 week(s):  1.  Pt FGA score will improve by 6 points to demonstrate improved balance and less falls risk  Eval: 15/30  PN: 17/30 progressing 24     2.  Pt will be able to walk without veering to demonstrate improved cervical ROM and vestibular function  Eval:significant veering with vertical > horizontal head turns   PN: min to mod veering with head turns progressing 24     3.  Pt cervical AROM will improve to WFL to allow pt to complete ADLs with increased ease.  Eval:Cervical flexion/extension: 45* flexion, 25*  Rotation: right 53*, left 47*  SB:  right 17*, left 17*  PN: progressing 24  Cervical flexion/extension: 45* flexion, 30*  Rotation: right 52* , left 45*  SB:  
3:00 PM Jose Carlos Crowell MD [facility] BS AMB   5/28/2024  3:50 PM [facility]   5/31/2024  1:10 PM Kyleigh Tolliver, [facility]

## 2024-05-23 NOTE — PROGRESS NOTES
PHYSICAL / OCCUPATIONAL THERAPY - DAILY TREATMENT NOTE     Patient Name: New Hinton    Date: 2024    : 1945  Insurance: Payor: MEDICARE / Plan: MEDICARE PART A AND B / Product Type: *No Product type* /      Patient  verified Yes     Visit #   Current / Total 5 16   Time   In / Out 1:14 2:07   Pain   In / Out 0 0   Subjective Functional Status/Changes: \"The left side of my neck is still pretty stiff.\"   Changes to:  Allergies, Med Hx, Sx Hx?   no       TREATMENT AREA =  Dizziness and giddiness [R42]    OBJECTIVE    Therapeutic Procedures:  Tx Min Billable or 1:1 Min (if diff from Tx Min) Procedure, Rationale, Specifics   18  35073 Therapeutic Exercise (timed):  increase ROM, strength, coordination, balance, and proprioception to improve patient's ability to progress to PLOF and address remaining functional goals. (see flow sheet as applicable)    Details if applicable:       22  88913 Neuromuscular Re-Education (timed):  improve balance, coordination, kinesthetic sense, posture, core stability and proprioception to improve patient's ability to develop conscious control of individual muscles and awareness of position of extremities in order to progress to PLOF and address remaining functional goals. (see flow sheet as applicable)    Details if applicable:     15  73707 Therapeutic Activity (timed):  use of dynamic activities replicating functional movements to increase ROM, strength, coordination, balance, and proprioception in order to improve patient's ability to progress to PLOF and address remaining functional goals.  (see flow sheet as applicable)     Details if applicable:     8  91783 Manual Therapy (timed):  decrease pain, increase ROM, and increase tissue extensibility to improve patient's ability to progress to PLOF and address remaining functional goals.  The manual therapy interventions were performed at a separate and distinct time from the therapeutic activities interventions . Details:

## 2024-05-24 ENCOUNTER — HOSPITAL ENCOUNTER (OUTPATIENT)
Facility: HOSPITAL | Age: 79
Setting detail: RECURRING SERIES
Discharge: HOME | End: 2024-05-27
Payer: MEDICARE

## 2024-05-24 PROCEDURE — 97140 MANUAL THERAPY 1/> REGIONS: CPT

## 2024-05-24 PROCEDURE — 97112 NEUROMUSCULAR REEDUCATION: CPT

## 2024-05-24 PROCEDURE — 97530 THERAPEUTIC ACTIVITIES: CPT

## 2024-05-24 PROCEDURE — 97110 THERAPEUTIC EXERCISES: CPT

## 2024-05-28 ENCOUNTER — TELEPHONE (OUTPATIENT)
Facility: HOSPITAL | Age: 79
End: 2024-05-28

## 2024-05-28 ENCOUNTER — APPOINTMENT (OUTPATIENT)
Facility: HOSPITAL | Age: 79
End: 2024-05-28
Payer: MEDICARE

## 2024-05-28 ENCOUNTER — OFFICE VISIT (OUTPATIENT)
Age: 79
End: 2024-05-28
Payer: MEDICARE

## 2024-05-28 VITALS
HEIGHT: 73 IN | HEART RATE: 80 BPM | SYSTOLIC BLOOD PRESSURE: 116 MMHG | BODY MASS INDEX: 24.39 KG/M2 | OXYGEN SATURATION: 98 % | WEIGHT: 184 LBS | DIASTOLIC BLOOD PRESSURE: 60 MMHG

## 2024-05-28 DIAGNOSIS — I69.30 HISTORY OF CVA WITH RESIDUAL DEFICIT: ICD-10-CM

## 2024-05-28 DIAGNOSIS — I95.1 ORTHOSTATIC HYPOTENSION: Primary | ICD-10-CM

## 2024-05-28 DIAGNOSIS — E11.40 TYPE 2 DIABETES MELLITUS WITH DIABETIC NEUROPATHY, UNSPECIFIED WHETHER LONG TERM INSULIN USE (HCC): ICD-10-CM

## 2024-05-28 DIAGNOSIS — I51.89 DIASTOLIC DYSFUNCTION WITHOUT HEART FAILURE: ICD-10-CM

## 2024-05-28 PROBLEM — H53.9 VISUAL CHANGES: Status: ACTIVE | Noted: 2024-01-13

## 2024-05-28 PROBLEM — J96.01 ACUTE RESPIRATORY FAILURE WITH HYPOXIA (HCC): Status: ACTIVE | Noted: 2024-02-23

## 2024-05-28 PROCEDURE — 99214 OFFICE O/P EST MOD 30 MIN: CPT | Performed by: INTERNAL MEDICINE

## 2024-05-28 PROCEDURE — 1036F TOBACCO NON-USER: CPT | Performed by: INTERNAL MEDICINE

## 2024-05-28 PROCEDURE — G8420 CALC BMI NORM PARAMETERS: HCPCS | Performed by: INTERNAL MEDICINE

## 2024-05-28 PROCEDURE — 1123F ACP DISCUSS/DSCN MKR DOCD: CPT | Performed by: INTERNAL MEDICINE

## 2024-05-28 PROCEDURE — 3074F SYST BP LT 130 MM HG: CPT | Performed by: INTERNAL MEDICINE

## 2024-05-28 PROCEDURE — G8427 DOCREV CUR MEDS BY ELIG CLIN: HCPCS | Performed by: INTERNAL MEDICINE

## 2024-05-28 PROCEDURE — 3078F DIAST BP <80 MM HG: CPT | Performed by: INTERNAL MEDICINE

## 2024-05-28 ASSESSMENT — PATIENT HEALTH QUESTIONNAIRE - PHQ9
SUM OF ALL RESPONSES TO PHQ QUESTIONS 1-9: 0
SUM OF ALL RESPONSES TO PHQ QUESTIONS 1-9: 0
5. POOR APPETITE OR OVEREATING: NOT AT ALL
4. FEELING TIRED OR HAVING LITTLE ENERGY: NOT AT ALL
SUM OF ALL RESPONSES TO PHQ9 QUESTIONS 1 & 2: 0
SUM OF ALL RESPONSES TO PHQ QUESTIONS 1-9: 0
1. LITTLE INTEREST OR PLEASURE IN DOING THINGS: NOT AT ALL
9. THOUGHTS THAT YOU WOULD BE BETTER OFF DEAD, OR OF HURTING YOURSELF: NOT AT ALL
SUM OF ALL RESPONSES TO PHQ QUESTIONS 1-9: 0
3. TROUBLE FALLING OR STAYING ASLEEP: NOT AT ALL
7. TROUBLE CONCENTRATING ON THINGS, SUCH AS READING THE NEWSPAPER OR WATCHING TELEVISION: NOT AT ALL
10. IF YOU CHECKED OFF ANY PROBLEMS, HOW DIFFICULT HAVE THESE PROBLEMS MADE IT FOR YOU TO DO YOUR WORK, TAKE CARE OF THINGS AT HOME, OR GET ALONG WITH OTHER PEOPLE: NOT DIFFICULT AT ALL
6. FEELING BAD ABOUT YOURSELF - OR THAT YOU ARE A FAILURE OR HAVE LET YOURSELF OR YOUR FAMILY DOWN: NOT AT ALL
8. MOVING OR SPEAKING SO SLOWLY THAT OTHER PEOPLE COULD HAVE NOTICED. OR THE OPPOSITE, BEING SO FIGETY OR RESTLESS THAT YOU HAVE BEEN MOVING AROUND A LOT MORE THAN USUAL: NOT AT ALL
2. FEELING DOWN, DEPRESSED OR HOPELESS: NOT AT ALL

## 2024-05-28 ASSESSMENT — ENCOUNTER SYMPTOMS
ABDOMINAL DISTENTION: 0
NAUSEA: 0
ABDOMINAL PAIN: 0
COUGH: 0
SHORTNESS OF BREATH: 0
VOMITING: 0
SORE THROAT: 0

## 2024-05-28 ASSESSMENT — ANXIETY QUESTIONNAIRES
4. TROUBLE RELAXING: NOT AT ALL
5. BEING SO RESTLESS THAT IT IS HARD TO SIT STILL: NOT AT ALL
GAD7 TOTAL SCORE: 0
1. FEELING NERVOUS, ANXIOUS, OR ON EDGE: NOT AT ALL
2. NOT BEING ABLE TO STOP OR CONTROL WORRYING: NOT AT ALL
3. WORRYING TOO MUCH ABOUT DIFFERENT THINGS: NOT AT ALL
7. FEELING AFRAID AS IF SOMETHING AWFUL MIGHT HAPPEN: NOT AT ALL
6. BECOMING EASILY ANNOYED OR IRRITABLE: NOT AT ALL

## 2024-05-28 NOTE — PROGRESS NOTES
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    Is someone accompanying this pt? yes    Is the patient using any DME equipment during OV? no    Depression Screening:  Depression: Not at risk (5/28/2024)    PHQ-2     PHQ-2 Score: 0        Learning Assessment:  Who is the primary learner? Patient    What is the preferred language for health care of the primary learner? ENGLISH    How does the primary learner prefer to learn new concepts? DEMONSTRATION    Answered By patient    Relationship to Learner SELF           Pt currently taking Anticoagulant therapy? no    Pt currently taking Antiplatelet therapy ? no      Coordination of Care:  1. Have you been to the ER, urgent care clinic since your last visit? Hospitalized since your last visit? no    2. Have you seen or consulted any other health care providers outside of the [facility] since your last visit? Include any pap smears or colon screening. no    
rales.   Abdominal:      General: Bowel sounds are normal. There is no distension.      Palpations: Abdomen is soft.      Tenderness: There is no abdominal tenderness.   Musculoskeletal:         General: No swelling or deformity.   Skin:     General: Skin is warm and dry.      Findings: No rash.   Neurological:      General: No focal deficit present.      Mental Status: He is alert and oriented to person, place, and time.   Psychiatric:         Mood and Affect: Mood normal.         Behavior: Behavior normal.         Assessment / Plan:     Dizziness/near syncope.  Patient was diagnosed with orthostatic hypotension earlier this year.  I suspect this is multifactorial with a component of orthostasis and also vestibular dysfunction.  He has been undergoing physical therapy for this and this has improved.  He is no longer on ramipril.  He was encouraged to remain well-hydrated and add salt to his diet.  His orthostatic vital signs were negative today in the office.    History of hypertension.  This has since resolved.  I suspect this may have been due to his weight loss.  No need for blood pressure lowering agents.    Diabetes mellitus, type II.  Managed by his PCP.  He is now taking oral agents.  He is no longer on insulin.    History of hemorrhagic CVA.  This occurred back in 2012.  He is avoiding antiplatelet agents due to the prior hemorrhage.    Probable diastolic dysfunction.  This was likely an incidental finding on his recent echocardiogram done at an outside cardiologist office.  He has no signs or symptoms of heart failure.  Farxiga is reasonable to treat this.  No need for additional medications at this time.      Patient can follow-up in the future as needed      Jose Carlos Crowell MD

## 2024-05-28 NOTE — TELEPHONE ENCOUNTER
pt's daughter double booked appts for mr. chavis at the same time. pt will be going to his cardiologist instead of PT. confirmed appt for friday

## 2024-05-31 ENCOUNTER — HOSPITAL ENCOUNTER (OUTPATIENT)
Facility: HOSPITAL | Age: 79
Setting detail: RECURRING SERIES
End: 2024-05-31
Payer: MEDICARE

## 2024-05-31 PROCEDURE — 97110 THERAPEUTIC EXERCISES: CPT

## 2024-05-31 PROCEDURE — 97530 THERAPEUTIC ACTIVITIES: CPT

## 2024-05-31 PROCEDURE — 97112 NEUROMUSCULAR REEDUCATION: CPT

## 2024-05-31 NOTE — PROGRESS NOTES
PHYSICAL / OCCUPATIONAL THERAPY - DAILY TREATMENT NOTE     Patient Name: New Hinton    Date: 2024    : 1945  Insurance: Payor: MEDICARE / Plan: MEDICARE PART A AND B / Product Type: *No Product type* /      Patient  verified Yes     Visit #   Current / Total 6 16   Time   In / Out 1:35 2:28   Pain   In / Out 0 0   Subjective Functional Status/Changes: \"The dizziness doesn't seem to go away. \"   Changes to:  Allergies, Med Hx, Sx Hx?   no       TREATMENT AREA =  Dizziness and giddiness [R42]    OBJECTIVE      Therapeutic Procedures:  Tx Min Billable or 1:1 Min (if diff from Tx Min) Procedure, Rationale, Specifics   13  07180 Therapeutic Exercise (timed):  increase ROM, strength, coordination, balance, and proprioception to improve patient's ability to progress to PLOF and address remaining functional goals. (see flow sheet as applicable)    Details if applicable:       30  24329 Neuromuscular Re-Education (timed):  improve balance, coordination, kinesthetic sense, posture, core stability and proprioception to improve patient's ability to develop conscious control of individual muscles and awareness of position of extremities in order to progress to PLOF and address remaining functional goals. (see flow sheet as applicable)    Details if applicable:     10  15515 Therapeutic Activity (timed):  use of dynamic activities replicating functional movements to increase ROM, strength, coordination, balance, and proprioception in order to improve patient's ability to progress to PLOF and address remaining functional goals.  (see flow sheet as applicable)     Details if applicable:           Details if applicable:            Details if applicable:     53  [facility] Totals Reminder: bill using total billable min of TIMED therapeutic procedures (example: do not include dry needle or estim unattended, both untimed codes, in totals to left)  8-22 min = 1 unit; 23-37 min = 2 units; 38-52 min = 3 units; 53-67 min = 4  risk  Eval: 15/30  PN: 17/30 progressing 5/21/24     2.  Pt will be able to walk without veering to demonstrate improved cervical ROM and vestibular function  Eval:significant veering with vertical > horizontal head turns   PN: Pt demos continued veering to the left with head turns no change 5/24/24  Current: Pt demos less veering to the left however compensates veering with shortened gait pattern 5/31/24     3.  Pt cervical AROM will improve to WFL to allow pt to complete ADLs with increased ease.  Eval:Cervical flexion/extension: 45* flexion, 25*  Rotation: right 53*, left 47*  SB:  right 17*, left 17*  PN: progressing 5/21/24  Cervical flexion/extension: 45* flexion, 30*  Rotation: right 52* , left 45*  SB:  right 20*, left 15*     4. Pt will report >80% improvement in symptoms to be able to return to PLOF  Eval: baseline  PN pt reporting good and bad days, no change in dizziness however noticing improving neck ROM progressing 5/21/24    PLAN  Yes  Continue plan of care  []  Upgrade activities as tolerated  []  Discharge due to :  []  Other:    New Banda PTA    5/31/2024    1:28 PM    Future Appointments   Date Time Provider Department Center   5/31/2024  1:50 PM [facility]

## 2024-06-03 ENCOUNTER — TELEPHONE (OUTPATIENT)
Facility: HOSPITAL | Age: 79
End: 2024-06-03

## 2024-06-03 NOTE — TELEPHONE ENCOUNTER
Called pt. to see if he wanted to come in New's 11:50 since he was on the waitslist. Pt. declined due to anniversary.

## 2024-06-06 ENCOUNTER — TELEPHONE (OUTPATIENT)
Facility: HOSPITAL | Age: 79
End: 2024-06-06

## 2024-06-06 NOTE — TELEPHONE ENCOUNTER
Pt. called to [facility]xl due to work. Unable to r/s at this time. Wants to be called on Friday if there is an opening.

## 2024-06-07 ENCOUNTER — HOSPITAL ENCOUNTER (OUTPATIENT)
Facility: HOSPITAL | Age: 79
Setting detail: RECURRING SERIES
Discharge: HOME | End: 2024-06-10
Payer: MEDICARE

## 2024-06-07 PROCEDURE — 97112 NEUROMUSCULAR REEDUCATION: CPT

## 2024-06-07 NOTE — PROGRESS NOTES
pain post session. Challenged with EC pertubation's on foam with delayed response noted with stepping stratgey to prevent LOB. Alexy to complte weaving without LOB and reaching to get balls today without LOB. Will continue to progress as tolerated.     Patient will continue to benefit from skilled PT / OT services to modify and progress therapeutic interventions, analyze and address functional mobility deficits, analyze and address ROM deficits, analyze and address strength deficits, analyze and address soft tissue restrictions, analyze and cue for proper movement patterns, analyze and modify for postural abnormalities, analyze and address imbalance/dizziness, and instruct in home and community integration to address functional deficits and attain remaining goals.    Progress toward goals / Updated goals:  []  See Progress Note/Recertification    Short Term Goals: STG- To be accomplished in 4 week(s):  1.  Pt will be independent with HEP to encourage prophylaxis.  Eval: HEP dispensed   PN compliance per pt report goal MET 5/21/24     2. Pt left SLS will improve to > 3 seconds without LOB to demonstrate improved stability on left LE with walking  Eval: 1-2 sec   PN> 3 sec bilaterally goal MET 5/21/24     Long Term Goals: LTG- To be accomplished in 8 week(s):  1.  Pt FGA score will improve by 6 points to demonstrate improved balance and less falls risk  Eval: 15/30  PN: 17/30 progressing 5/21/24     2.  Pt will be able to walk without veering to demonstrate improved cervical ROM and vestibular function  Eval:significant veering with vertical > horizontal head turns   PN: Pt demos continued veering to the left with head turns no change 5/24/24  Current: Pt demos less veering to the left however compensates veering with shortened gait pattern 5/31/24     3.  Pt cervical AROM will improve to WFL to allow pt to complete ADLs with increased ease.  Eval:Cervical flexion/extension: 45* flexion, 25*  Rotation: right 53*, left

## 2024-06-11 ENCOUNTER — APPOINTMENT (OUTPATIENT)
Facility: HOSPITAL | Age: 79
End: 2024-06-11
Payer: MEDICARE

## 2024-06-14 ENCOUNTER — HOSPITAL ENCOUNTER (OUTPATIENT)
Facility: HOSPITAL | Age: 79
Setting detail: RECURRING SERIES
Discharge: HOME | End: 2024-06-17
Payer: MEDICARE

## 2024-06-14 PROCEDURE — 97530 THERAPEUTIC ACTIVITIES: CPT

## 2024-06-14 PROCEDURE — 97110 THERAPEUTIC EXERCISES: CPT

## 2024-06-14 PROCEDURE — 97112 NEUROMUSCULAR REEDUCATION: CPT

## 2024-06-14 NOTE — PROGRESS NOTES
PHYSICAL / OCCUPATIONAL THERAPY - DAILY TREATMENT NOTE     Patient Name: New Hinton    Date: 2024    : 1945  Insurance: Payor: MEDICARE / Plan: MEDICARE PART A AND B / Product Type: *No Product type* /      Patient  verified Yes     Visit #   Current / Total 8 16   Time   In / Out 2:37 3:15   Pain   In / Out 0 0   Subjective Functional Status/Changes: Some days I feel good; other days I just feel a little blurry   Changes to:  Allergies, Med Hx, Sx Hx?   no       TREATMENT AREA =  Dizziness and giddiness [R42]    OBJECTIVE    Therapeutic Procedures:  Tx Min Billable or 1:1 Min (if diff from Tx Min) Procedure, Rationale, Specifics   11  77011 Therapeutic Exercise (timed):  increase ROM, strength, coordination, balance, and proprioception to improve patient's ability to progress to PLOF and address remaining functional goals. (see flow sheet as applicable)    Details if applicable:       15  55539 Neuromuscular Re-Education (timed):  improve balance, coordination, kinesthetic sense, posture, core stability and proprioception to improve patient's ability to develop conscious control of individual muscles and awareness of position of extremities in order to progress to PLOF and address remaining functional goals. (see flow sheet as applicable)    Details if applicable:     12  22158 Therapeutic Activity (timed):  use of dynamic activities replicating functional movements to increase ROM, strength, coordination, balance, and proprioception in order to improve patient's ability to progress to PLOF and address remaining functional goals.  (see flow sheet as applicable)     Details if applicable:           Details if applicable:            Details if applicable:     38  [facility] Totals Reminder: bill using total billable min of TIMED therapeutic procedures (example: do not include dry needle or estim unattended, both untimed codes, in totals to left)  8-22 min = 1 unit; 23-37 min = 2 units; 38-52 min = 3

## 2024-06-19 ENCOUNTER — HOSPITAL ENCOUNTER (OUTPATIENT)
Facility: HOSPITAL | Age: 79
Setting detail: RECURRING SERIES
Discharge: HOME | End: 2024-06-22
Payer: MEDICARE

## 2024-06-19 PROCEDURE — 97530 THERAPEUTIC ACTIVITIES: CPT

## 2024-06-19 PROCEDURE — 97112 NEUROMUSCULAR REEDUCATION: CPT

## 2024-06-19 PROCEDURE — 97110 THERAPEUTIC EXERCISES: CPT

## 2024-06-19 NOTE — PROGRESS NOTES
PHYSICAL / OCCUPATIONAL THERAPY - DAILY TREATMENT NOTE     Patient Name: New Hinton    Date: 2024    : 1945  Insurance: Payor: MEDICARE / Plan: MEDICARE PART A AND B / Product Type: *No Product type* /      Patient  verified Yes     Visit #   Current / Total 9 16   Time   In / Out 156 pm 252 pm   Pain   In / Out 0/10 0/10   Subjective Functional Status/Changes: Pt reports he thinks part of the problem is his eyes and depth perception. He says he has three eye doctors total but two this week.    Changes to:  Allergies, Med Hx, Sx Hx?   no       TREATMENT AREA =  Dizziness and giddiness [R42]    OBJECTIVE    Therapeutic Procedures:  Tx Min Billable or 1:1 Min (if diff from Tx Min) Procedure, Rationale, Specifics   13  40208 Therapeutic Exercise (timed):  increase ROM, strength, coordination, balance, and proprioception to improve patient's ability to progress to PLOF and address remaining functional goals. (see flow sheet as applicable)    Details if applicable:       30  28814 Neuromuscular Re-Education (timed):  improve balance, coordination, kinesthetic sense, posture, core stability and proprioception to improve patient's ability to develop conscious control of individual muscles and awareness of position of extremities in order to progress to PLOF and address remaining functional goals. (see flow sheet as applicable)    Details if applicable:     13  24496 Therapeutic Activity (timed):  use of dynamic activities replicating functional movements to increase ROM, strength, coordination, balance, and proprioception in order to improve patient's ability to progress to PLOF and address remaining functional goals.  (see flow sheet as applicable)     Details if applicable:           Details if applicable:            Details if applicable:     56  [facility] Totals Reminder: bill using total billable min of TIMED therapeutic procedures (example: do not include dry needle or estim unattended, both untimed

## 2024-06-21 ENCOUNTER — HOSPITAL ENCOUNTER (OUTPATIENT)
Facility: HOSPITAL | Age: 79
Setting detail: RECURRING SERIES
Discharge: HOME | End: 2024-06-24
Payer: MEDICARE

## 2024-06-21 PROCEDURE — 97530 THERAPEUTIC ACTIVITIES: CPT

## 2024-06-21 PROCEDURE — 97112 NEUROMUSCULAR REEDUCATION: CPT

## 2024-06-21 PROCEDURE — 97110 THERAPEUTIC EXERCISES: CPT

## 2024-06-21 NOTE — PROGRESS NOTES
min = 5 units   Total Total     TOTAL TREATMENT TIME:        44     [x]  Patient Education billed concurrently with other procedures   [x] Review HEP    [] Progressed/Changed HEP, detail:    [] Other detail:       Objective Information/Functional Measures/Assessment    Pt presented to therapy with c/c dizziness describing as LOB and unsteadiness ongoing for the last year with insidious onset. Pt has attended 10 sessions including initial eval with reporting no change in dizziness symptoms however demonstrating significant improvement in FGA score and no longer veering with walking with head turns. Veering noted with walking EC to right and LOB with STS howevr improved with cues. Cervical ROM is improving as well however still limited with left rotation. Suspect dizziness is more central in nature with pt reporting having follow up with MD for DM and eye appointment soon. Pt would benefit from continued skilled PT services to address remaining unmet goals and above deficits to allow pt to complete ADLs with increased ease and less pain.       Patient will continue to benefit from skilled PT / OT services to modify and progress therapeutic interventions, analyze and address functional mobility deficits, analyze and address ROM deficits, analyze and address strength deficits, analyze and cue for proper movement patterns, analyze and modify for postural abnormalities, analyze and address imbalance/dizziness, and instruct in home and community integration to address functional deficits and attain remaining goals.    Progress toward goals / Updated goals:  []  See Progress Note/Recertification    Short Term Goals: STG- To be accomplished in 4 week(s):  1.  Pt will be independent with HEP to encourage prophylaxis.  Eval: HEP dispensed   PN compliance per pt report goal MET 5/21/24     2. Pt left SLS will improve to > 3 seconds without LOB to demonstrate improved stability on left LE with walking  Eval: 1-2 sec   PN> 3 sec 
deficits to allow pt to complete ADLs with increased ease and less pain.     Patient would benefit from the continuation of skilled rehab interventions for functional progress to achieving above stated clinically significant goals.    New Certification Period: 6/21/24 - 7/12/24      Kyleigh Tolliver, PT 6/21/2024 1:43 PM    ________________________________________________________________________  I certify that the above Therapy Services are being furnished while the patient is under my care. I agree with the treatment plan and certify that this therapy is necessary.    [] I have read the above and request that my patient continue as recommended.  [] I have read the above report and request that my patient continue therapy with the following changes/special instructions: _______________________________________  [] I have read the above report and request that my patient be discharged from therapy    Physician's Signature:____________________ Date:_________ TIME:________    ** Signature, Date and Time must be completed for valid certification **    Please sign and return to:     [facility] at [address]  [place]  [address]  Phone: [phone]      Fax:  [phone]

## 2024-06-25 ENCOUNTER — HOSPITAL ENCOUNTER (OUTPATIENT)
Facility: HOSPITAL | Age: 79
Setting detail: RECURRING SERIES
Discharge: HOME | End: 2024-06-28
Payer: MEDICARE

## 2024-06-25 PROCEDURE — 97140 MANUAL THERAPY 1/> REGIONS: CPT

## 2024-06-25 PROCEDURE — 97110 THERAPEUTIC EXERCISES: CPT

## 2024-06-25 PROCEDURE — 97112 NEUROMUSCULAR REEDUCATION: CPT

## 2024-06-25 NOTE — PROGRESS NOTES
PHYSICAL / OCCUPATIONAL THERAPY - DAILY TREATMENT NOTE     Patient Name: New Hinton    Date: 2024    : 1945  Insurance: Payor: MEDICARE / Plan: MEDICARE PART A AND B / Product Type: *No Product type* /      Patient  verified Yes     Visit #   Current / Total 11 16   Time   In / Out 1:10 1:50   Pain   In / Out 0 0   Subjective Functional Status/Changes: Okay as I can be    Changes to:  Allergies, Med Hx, Sx Hx?   no       TREATMENT AREA =  Dizziness and giddiness [R42]    OBJECTIVE        Therapeutic Procedures:  Tx Min Billable or 1:1 Min (if diff from Tx Min) Procedure, Rationale, Specifics   18  87788 Therapeutic Exercise (timed):  increase ROM, strength, coordination, balance, and proprioception to improve patient's ability to progress to PLOF and address remaining functional goals. (see flow sheet as applicable)    Details if applicable:       12  88581 Neuromuscular Re-Education (timed):  improve balance, coordination, kinesthetic sense, posture, core stability and proprioception to improve patient's ability to develop conscious control of individual muscles and awareness of position of extremities in order to progress to PLOF and address remaining functional goals. (see flow sheet as applicable)    Details if applicable:     10  34988 Manual Therapy (timed):  decrease pain, increase ROM, and increase tissue extensibility to improve patient's ability to progress to PLOF and address remaining functional goals.  The manual therapy interventions were performed at a separate and distinct time from the therapeutic activities interventions . Details: STM to cervical paraspinals, rib mobs    Details if applicable:            Details if applicable:     40  [facility] Totals Reminder: bill using total billable min of TIMED therapeutic procedures (example: do not include dry needle or estim unattended, both untimed codes, in totals to left)  8-22 min = 1 unit; 23-37 min = 2 units; 38-52 min = 3 units; 53-67

## 2024-06-27 ENCOUNTER — HOSPITAL ENCOUNTER (OUTPATIENT)
Facility: HOSPITAL | Age: 79
Setting detail: RECURRING SERIES
Discharge: HOME | End: 2024-06-30
Payer: MEDICARE

## 2024-06-27 PROCEDURE — 97110 THERAPEUTIC EXERCISES: CPT

## 2024-06-27 PROCEDURE — 97112 NEUROMUSCULAR REEDUCATION: CPT

## 2024-06-27 PROCEDURE — 97530 THERAPEUTIC ACTIVITIES: CPT

## 2024-06-27 NOTE — PROGRESS NOTES
PHYSICAL / OCCUPATIONAL THERAPY - DAILY TREATMENT NOTE     Patient Name: New Hinton    Date: 2024    : 1945  Insurance: Payor: MEDICARE / Plan: MEDICARE PART A AND B / Product Type: *No Product type* /      Patient  verified Yes     Visit #   Current / Total 12 16   Time   In / Out 1:10 1:50   Pain   In / Out 0 0   Subjective Functional Status/Changes: I'm alright   Changes to:  Allergies, Med Hx, Sx Hx?   no       TREATMENT AREA =  Dizziness and giddiness [R42]    OBJECTIVE      Therapeutic Procedures:  Tx Min Billable or 1:1 Min (if diff from Tx Min) Procedure, Rationale, Specifics   20  69943 Therapeutic Exercise (timed):  increase ROM, strength, coordination, balance, and proprioception to improve patient's ability to progress to PLOF and address remaining functional goals. (see flow sheet as applicable)    Details if applicable:       10  74117 Neuromuscular Re-Education (timed):  improve balance, coordination, kinesthetic sense, posture, core stability and proprioception to improve patient's ability to develop conscious control of individual muscles and awareness of position of extremities in order to progress to PLOF and address remaining functional goals. (see flow sheet as applicable)    Details if applicable:     10  67178 Therapeutic Activity (timed):  use of dynamic activities replicating functional movements to increase ROM, strength, coordination, balance, and proprioception in order to improve patient's ability to progress to PLOF and address remaining functional goals.  (see flow sheet as applicable)     Details if applicable:           Details if applicable:            Details if applicable:     40  [facility] Totals Reminder: bill using total billable min of TIMED therapeutic procedures (example: do not include dry needle or estim unattended, both untimed codes, in totals to left)  8-22 min = 1 unit; 23-37 min = 2 units; 38-52 min = 3 units; 53-67 min = 4 units; 68-82 min = 5 units

## 2024-07-01 ENCOUNTER — APPOINTMENT (OUTPATIENT)
Facility: HOSPITAL | Age: 79
End: 2024-07-01
Payer: MEDICARE

## 2024-07-03 ENCOUNTER — HOSPITAL ENCOUNTER (OUTPATIENT)
Facility: HOSPITAL | Age: 79
Setting detail: RECURRING SERIES
Discharge: HOME | End: 2024-07-06
Payer: MEDICARE

## 2024-07-03 PROCEDURE — 97112 NEUROMUSCULAR REEDUCATION: CPT

## 2024-07-03 PROCEDURE — 97530 THERAPEUTIC ACTIVITIES: CPT

## 2024-07-03 PROCEDURE — 97110 THERAPEUTIC EXERCISES: CPT

## 2024-07-03 NOTE — PROGRESS NOTES
PHYSICAL / OCCUPATIONAL THERAPY - DAILY TREATMENT NOTE     Patient Name: New Hinton    Date: 7/3/2024    : 1945  Insurance: Payor: MEDICARE / Plan: MEDICARE PART A AND B / Product Type: *No Product type* /      Patient  verified Yes     Visit #   Current / Total 13 16   Time   In / Out 12:30 1:10   Pain   In / Out 0 0   Subjective Functional Status/Changes: \"I don't know if the dizziness will get any better.\"   Changes to:  Allergies, Med Hx, Sx Hx?   no       TREATMENT AREA =  Dizziness and giddiness [R42]    OBJECTIVE    Therapeutic Procedures:  Tx Min Billable or 1:1 Min (if diff from Tx Min) Procedure, Rationale, Specifics   10  43127 Therapeutic Exercise (timed):  increase ROM, strength, coordination, balance, and proprioception to improve patient's ability to progress to PLOF and address remaining functional goals. (see flow sheet as applicable)    Details if applicable:       20  33700 Neuromuscular Re-Education (timed):  improve balance, coordination, kinesthetic sense, posture, core stability and proprioception to improve patient's ability to develop conscious control of individual muscles and awareness of position of extremities in order to progress to PLOF and address remaining functional goals. (see flow sheet as applicable)    Details if applicable:     10  90026 Therapeutic Activity (timed):  use of dynamic activities replicating functional movements to increase ROM, strength, coordination, balance, and proprioception in order to improve patient's ability to progress to PLOF and address remaining functional goals.  (see flow sheet as applicable)     Details if applicable:           Details if applicable:            Details if applicable:     40  [facility] Totals Reminder: bill using total billable min of TIMED therapeutic procedures (example: do not include dry needle or estim unattended, both untimed codes, in totals to left)  8-22 min = 1 unit; 23-37 min = 2 units; 38-52 min = 3 units; 53-67

## 2024-07-08 ENCOUNTER — HOSPITAL ENCOUNTER (OUTPATIENT)
Facility: HOSPITAL | Age: 79
Setting detail: RECURRING SERIES
Discharge: HOME | End: 2024-07-11
Payer: MEDICARE

## 2024-07-08 PROCEDURE — 97112 NEUROMUSCULAR REEDUCATION: CPT

## 2024-07-08 PROCEDURE — 97530 THERAPEUTIC ACTIVITIES: CPT

## 2024-07-08 PROCEDURE — 97110 THERAPEUTIC EXERCISES: CPT

## 2024-07-08 NOTE — PROGRESS NOTES
PHYSICAL / OCCUPATIONAL THERAPY - DAILY TREATMENT NOTE     Patient Name: New Hinton    Date: 2024    : 1945  Insurance: Payor: MEDICARE / Plan: MEDICARE PART A AND B / Product Type: *No Product type* /      Patient  verified Yes     Visit #   Current / Total 14 18   Time   In / Out 1107 1155   Pain   In / Out 0 0   Subjective Functional Status/Changes: About the same    Changes to:  Allergies, Med Hx, Sx Hx?   no       TREATMENT AREA =  Dizziness and giddiness [R42]    OBJECTIVE    Therapeutic Procedures:  Tx Min Billable or 1:1 Min (if diff from Tx Min) Procedure, Rationale, Specifics   13 7 57679 Therapeutic Exercise (timed):  increase ROM, strength, coordination, balance, and proprioception to improve patient's ability to progress to PLOF and address remaining functional goals. (see flow sheet as applicable)    Details if applicable:        63103 Neuromuscular Re-Education (timed):  improve balance, coordination, kinesthetic sense, posture, core stability and proprioception to improve patient's ability to develop conscious control of individual muscles and awareness of position of extremities in order to progress to PLOF and address remaining functional goals. (see flow sheet as applicable)    Details if applicable:     15 15 88952 Therapeutic Activity (timed):  use of dynamic activities replicating functional movements to increase ROM, strength, coordination, balance, and proprioception in order to improve patient's ability to progress to PLOF and address remaining functional goals.  (see flow sheet as applicable)     Details if applicable:           Details if applicable:            Details if applicable:     48 42 [facility] Totals Reminder: bill using total billable min of TIMED therapeutic procedures (example: do not include dry needle or estim unattended, both untimed codes, in totals to left)  8-22 min = 1 unit; 23-37 min = 2 units; 38-52 min = 3 units; 53-67 min = 4 units; 68-82 min = 5

## 2024-07-10 ENCOUNTER — TELEPHONE (OUTPATIENT)
Facility: HOSPITAL | Age: 79
End: 2024-07-10

## 2024-07-10 ENCOUNTER — HOSPITAL ENCOUNTER (OUTPATIENT)
Facility: HOSPITAL | Age: 79
Setting detail: RECURRING SERIES
End: 2024-07-10
Payer: MEDICARE

## 2024-07-15 ENCOUNTER — HOSPITAL ENCOUNTER (OUTPATIENT)
Facility: HOSPITAL | Age: 79
Setting detail: RECURRING SERIES
Discharge: HOME | End: 2024-07-18
Payer: MEDICARE

## 2024-07-15 PROCEDURE — 97530 THERAPEUTIC ACTIVITIES: CPT

## 2024-07-15 PROCEDURE — 97112 NEUROMUSCULAR REEDUCATION: CPT

## 2024-07-15 PROCEDURE — 97140 MANUAL THERAPY 1/> REGIONS: CPT

## 2024-07-15 NOTE — PROGRESS NOTES
[facility] [place]  [address], [address]  [place]  [address]  Phone: [phone]      Fax:  [phone]      Continued Plan of Care/ Re-certification for Physical Therapy Services    Patient name: New Hinton Start of Care: 24   Referral source: Carmelo Alvarez MD : 1945   Medical/Treatment Diagnosis: Dizziness and giddiness [R42] Onset Date:23     Prior Hospitalization: see medical history Provider#: [id number]   Medications: Verified on Patient Summary List      Comorbidities: Diabetes mellitus, Neurologic condition, and Uncorrected hearing or vision impairment    Prior Level of Function:full function without dizziness or LOB    Visits from Start of Care: 15    Missed Visits: 3    Reporting Period: 24 to 7/15/24    The Plan of Care and following information is based on the patient's current status:    Key functional changes: improved FGA score, no longer having LOB with STS and getting in/out car, less veering with walking with EC      Problems/ barriers to goal attainment: depth perception and vision impairment, decreased cervical ROM, decreased balance with EC     Problem List: pain affecting function, decrease strength, impaired gait/balance, decrease ADL/functional abilities, decrease activity tolerance, decrease flexibility/joint mobility, and decrease transfer abilities     Treatment Plan: Therapeutic exercise, Neuromuscular reeducation, Manual therapy, Therapeutic activity, Self care/home management, and Gait training    Goals for this certification period to be accomplished in 4 weeks  Short Term Goals: STG- To be accomplished in 4 week(s):  1.  Pt will be independent with HEP to encourage prophylaxis.  Eval: HEP dispensed   PN compliance per pt report goal MET 24     2. Pt left SLS will improve to > 3 seconds without LOB to demonstrate improved stability on left LE with walking  Eval: 1-2 sec   PN> 3 sec bilaterally goal MET 24   
therapeutic procedures (example: do not include dry needle or estim unattended, both untimed codes, in totals to left)  8-22 min = 1 unit; 23-37 min = 2 units; 38-52 min = 3 units; 53-67 min = 4 units; 68-82 min = 5 units   Total Total     TOTAL TREATMENT TIME:        42     [x]  Patient Education billed concurrently with other procedures   [x] Review HEP    [] Progressed/Changed HEP, detail:    [] Other detail:       Objective Information/Functional Measures/Assessment    /60, HR 77bpm    Pt presented to therapy with c/c dizziness and imbalance. Pt has attended 15 sessions with making good progress towards goals with improvements noted in FGA, less veering with walking with head turns and EC, and improved stability noted with STS. Pt reporting no change in dizziness with reporting some vision changes as well with having follow ups with eye doctor and cardiologist. Plan to finish out last scheduled appointments with then DC with updated HEP to continue to make further gains in balance.     Patient will continue to benefit from skilled PT / OT services to modify and progress therapeutic interventions, analyze and address functional mobility deficits, analyze and address ROM deficits, analyze and cue for proper movement patterns, analyze and modify for postural abnormalities, analyze and address imbalance/dizziness, and instruct in home and community integration to address functional deficits and attain remaining goals.    Progress toward goals / Updated goals:  []  See Progress Note/Recertification    hort Term Goals: STG- To be accomplished in 4 week(s):  1.  Pt will be independent with HEP to encourage prophylaxis.  Eval: HEP dispensed   PN compliance per pt report goal MET 5/21/24     2. Pt left SLS will improve to > 3 seconds without LOB to demonstrate improved stability on left LE with walking  Eval: 1-2 sec   PN> 3 sec bilaterally goal MET 5/21/24     Long Term Goals: LTG- To be accomplished in 8

## 2024-07-18 ENCOUNTER — HOSPITAL ENCOUNTER (OUTPATIENT)
Facility: HOSPITAL | Age: 79
Setting detail: RECURRING SERIES
Discharge: HOME | End: 2024-07-21
Payer: MEDICARE

## 2024-07-18 PROCEDURE — 97110 THERAPEUTIC EXERCISES: CPT

## 2024-07-18 PROCEDURE — 97112 NEUROMUSCULAR REEDUCATION: CPT

## 2024-07-18 PROCEDURE — 97140 MANUAL THERAPY 1/> REGIONS: CPT

## 2024-07-18 NOTE — PROGRESS NOTES
PHYSICAL / OCCUPATIONAL THERAPY - DAILY TREATMENT NOTE    Patient Name: New Hinton    Date: 2024    : 1945  Insurance: Payor: MEDICARE / Plan: MEDICARE PART A AND B / Product Type: *No Product type* /      Patient  verified Yes     Visit #   Current / Total 16 23   Time   In / Out 155 235   Pain   In / Out 0 0   Subjective Functional Status/Changes: Pt denied pain but noted stiffness in B shoulders     TREATMENT AREA =  Dizziness and giddiness [R42]     OBJECTIVE         Therapeutic Procedures:    Tx Min Billable or 1:1 Min (if diff from Tx Min) Procedure, Rationale, Specifics   10  53103 Therapeutic Exercise (timed):  increase ROM, strength, coordination, balance, and proprioception to improve patient's ability to progress to PLOF and address remaining functional goals. (see flow sheet as applicable)     Details if applicable:         91285 Neuromuscular Re-Education (timed):  improve balance, coordination, kinesthetic sense, posture, core stability and proprioception to improve patient's ability to develop conscious control of individual muscles and awareness of position of extremities in order to progress to PLOF and address remaining functional goals. (see flow sheet as applicable)     Details if applicable:     10  50605 Manual Therapy (timed):  increase ROM, increase tissue extensibility, and decrease trigger points to improve patient's ability to progress to PLOF and address remaining functional goals.  The manual therapy interventions were performed at a separate and distinct time from the therapeutic activities interventions . (see flow sheet as applicable)     Details if applicable:            Details if applicable:            Details if applicable:     40  [facility] Totals Reminder: bill using total billable min of TIMED therapeutic procedures (example: do not include dry needle or estim unattended, both untimed codes, in totals to left)  8-22 min = 1 unit; 23-37 min = 2 units; 38-52 min =

## 2024-07-22 ENCOUNTER — HOSPITAL ENCOUNTER (OUTPATIENT)
Facility: HOSPITAL | Age: 79
Setting detail: RECURRING SERIES
Discharge: HOME | End: 2024-07-25
Payer: MEDICARE

## 2024-07-22 PROCEDURE — 97110 THERAPEUTIC EXERCISES: CPT

## 2024-07-22 PROCEDURE — 97140 MANUAL THERAPY 1/> REGIONS: CPT

## 2024-07-22 PROCEDURE — 97112 NEUROMUSCULAR REEDUCATION: CPT

## 2024-07-22 NOTE — PROGRESS NOTES
risk  Eval: 15/30  PN:25/30 goal MET 6/21/24     2.  Pt will be able to walk without veering to demonstrate improved cervical ROM and vestibular function  Eval:significant veering with vertical > horizontal head turns   PN: able to walk without veering goal MET 6/21/24     3.  Pt cervical AROM will improve to WFL to allow pt to complete ADLs with increased ease.  Eval:Cervical flexion/extension: 45* flexion, 25*  Rotation: right 53*, left 47*  SB:  right 17*, left 17*  PN:progressing 7/15/24  Cervical flexion/extension: 45* flexion, 35* extension   Rotation: right 45, left 40*   SB:  right 20*, left 18*      4. Pt will report >80% improvement in symptoms to be able to return to PLOF  Eval: baseline  PN: pt reporting balance is better however no change in dizziness progressing 7/15/24  Current: increased dizziness today regression 7/22/24     Updated goals 6/21/24:  Pt will be able to walk 20ft with EC with < 2ft veering to demonstrate improved vestibular function  PN:2-4ft progressing 7/15/24  Current: no change (7/18/24)     2. Pt will be able to get out of car without LOB to demonstrate improved functional LE strength              PN: Able to get out of car without LOB 7/3/24 MET    PLAN  Yes  Continue plan of care  []  Upgrade activities as tolerated  []  Discharge due to :  []  Other:    Kyleigh Tolliver PT    7/22/2024    1:12 PM    Future Appointments   Date Time Provider Department Center   7/22/2024  1:50 PM Kyleigh Tolliver PT [facility]   7/25/2024  1:50 PM Kyleigh Sal PTA [facility]   7/29/2024  1:50 PM New Banda PTA [facility]

## 2024-07-25 ENCOUNTER — HOSPITAL ENCOUNTER (OUTPATIENT)
Facility: HOSPITAL | Age: 79
Setting detail: RECURRING SERIES
Discharge: HOME | End: 2024-07-28
Payer: MEDICARE

## 2024-07-25 PROCEDURE — 97140 MANUAL THERAPY 1/> REGIONS: CPT

## 2024-07-25 PROCEDURE — 97112 NEUROMUSCULAR REEDUCATION: CPT

## 2024-07-25 PROCEDURE — 97110 THERAPEUTIC EXERCISES: CPT

## 2024-07-25 NOTE — PROGRESS NOTES
bill using total billable min of TIMED therapeutic procedures (example: do not include dry needle or estim unattended, both untimed codes, in totals to left)  8-22 min = 1 unit; 23-37 min = 2 units; 38-52 min = 3 units; 53-67 min = 4 units; 68-82 min = 5 units   Total Total     TOTAL TREATMENT TIME:        62     [x]  Patient Education billed concurrently with other procedures   [x] Review HEP    [] Progressed/Changed HEP, detail:    [] Other detail:       Objective Information/Functional Measures/Assessment    Progressed therex with added mobility and rotational strength with good tolerance and no increase in sxs.  Pt noted 50% improved dizziness.    Patient will continue to benefit from skilled PT / OT services to modify and progress therapeutic interventions, analyze and address functional mobility deficits, analyze and address ROM deficits, analyze and address strength deficits, analyze and address soft tissue restrictions, and analyze and cue for proper movement patterns to address functional deficits and attain remaining goals.    Progress toward goals / Updated goals:  []  See Progress Note/Recertification    hort Term Goals: STG- To be accomplished in 4 week(s):  1.  Pt will be independent with HEP to encourage prophylaxis.  Eval: HEP dispensed   PN compliance per pt report goal MET 5/21/24     2. Pt left SLS will improve to > 3 seconds without LOB to demonstrate improved stability on left LE with walking  Eval: 1-2 sec   PN> 3 sec bilaterally goal MET 5/21/24     Long Term Goals: LTG- To be accomplished in 8 week(s):  1.  Pt FGA score will improve by 6 points to demonstrate improved balance and less falls risk  Eval: 15/30  PN:25/30 goal MET 6/21/24     2.  Pt will be able to walk without veering to demonstrate improved cervical ROM and vestibular function  Eval:significant veering with vertical > horizontal head turns   PN: able to walk without veering goal MET 6/21/24     3.  Pt cervical AROM will improve

## 2024-07-29 ENCOUNTER — HOSPITAL ENCOUNTER (OUTPATIENT)
Facility: HOSPITAL | Age: 79
Setting detail: RECURRING SERIES
Discharge: HOME | End: 2024-08-01
Payer: MEDICARE

## 2024-07-29 PROCEDURE — 97530 THERAPEUTIC ACTIVITIES: CPT

## 2024-07-29 PROCEDURE — 97110 THERAPEUTIC EXERCISES: CPT

## 2024-07-29 PROCEDURE — 97112 NEUROMUSCULAR REEDUCATION: CPT

## 2024-07-29 NOTE — PROGRESS NOTES
PHYSICAL / OCCUPATIONAL THERAPY - DAILY TREATMENT NOTE     Patient Name: New Hinton    Date: 2024    : 1945  Insurance: Payor: MEDICARE / Plan: MEDICARE PART A AND B / Product Type: *No Product type* /      Patient  verified Yes     Visit #   Current / Total 19 23   Time   In / Out 1:49 2:42   Pain   In / Out 0 0   Subjective Functional Status/Changes: Still a little woozy today   Changes to:  Allergies, Med Hx, Sx Hx?   no       TREATMENT AREA =  Dizziness and giddiness [R42]    If an interpreting service is utilized for treatment of this patient, the contents of this document represent the material reviewed with the patient via the .     OBJECTIVE    Therapeutic Procedures:  Tx Min Billable or 1:1 Min (if diff from Tx Min) Procedure, Rationale, Specifics   13  55070 Therapeutic Exercise (timed):  increase ROM, strength, coordination, balance, and proprioception to improve patient's ability to progress to PLOF and address remaining functional goals. (see flow sheet as applicable)    Details if applicable:       30  76285 Neuromuscular Re-Education (timed):  improve balance, coordination, kinesthetic sense, posture, core stability and proprioception to improve patient's ability to develop conscious control of individual muscles and awareness of position of extremities in order to progress to PLOF and address remaining functional goals. (see flow sheet as applicable)    Details if applicable:     10  05733 Therapeutic Activity (timed):  use of dynamic activities replicating functional movements to increase ROM, strength, coordination, balance, and proprioception in order to improve patient's ability to progress to PLOF and address remaining functional goals.  (see flow sheet as applicable)     Details if applicable:           Details if applicable:            Details if applicable:     53  [facility] Totals Reminder: bill using total billable min of TIMED therapeutic procedures (example: do

## 2024-07-31 ENCOUNTER — APPOINTMENT (OUTPATIENT)
Facility: HOSPITAL | Age: 79
End: 2024-07-31
Payer: MEDICARE

## 2024-08-02 ENCOUNTER — HOSPITAL ENCOUNTER (OUTPATIENT)
Facility: HOSPITAL | Age: 79
Setting detail: RECURRING SERIES
Discharge: HOME | End: 2024-08-05
Payer: MEDICARE

## 2024-08-02 PROCEDURE — 97110 THERAPEUTIC EXERCISES: CPT

## 2024-08-02 PROCEDURE — 97112 NEUROMUSCULAR REEDUCATION: CPT

## 2024-08-02 PROCEDURE — 97530 THERAPEUTIC ACTIVITIES: CPT

## 2024-08-02 NOTE — PROGRESS NOTES
PHYSICAL / OCCUPATIONAL THERAPY - DAILY TREATMENT NOTE     Patient Name: New Hinton    Date: 2024    : 1945  Insurance: Payor: MEDICARE / Plan: MEDICARE PART A AND B / Product Type: *No Product type* /      Patient  verified Yes     Visit #   Current / Total 20 23   Time   In / Out 110 150   Pain   In / Out 0 0   Subjective Functional Status/Changes: I am really dizzy today   Changes to:  Allergies, Med Hx, Sx Hx?   no       TREATMENT AREA =  Dizziness and giddiness [R42]    If an interpreting service is utilized for treatment of this patient, the contents of this document represent the material reviewed with the patient via the .     OBJECTIVE      Therapeutic Procedures:  Tx Min Billable or 1:1 Min (if diff from Tx Min) Procedure, Rationale, Specifics   8  12478 Therapeutic Exercise (timed):  increase ROM, strength, coordination, balance, and proprioception to improve patient's ability to progress to PLOF and address remaining functional goals. (see flow sheet as applicable)    Details if applicable:       20  72218 Neuromuscular Re-Education (timed):  improve balance, coordination, kinesthetic sense, posture, core stability and proprioception to improve patient's ability to develop conscious control of individual muscles and awareness of position of extremities in order to progress to PLOF and address remaining functional goals. (see flow sheet as applicable)    Details if applicable:     12  72099 Therapeutic Activity (timed):  use of dynamic activities replicating functional movements to increase ROM, strength, coordination, balance, and proprioception in order to improve patient's ability to progress to PLOF and address remaining functional goals.  (see flow sheet as applicable)     Details if applicable:           Details if applicable:            Details if applicable:     40  [facility] Totals Reminder: bill using total billable min of TIMED therapeutic procedures (example: do not

## 2024-08-06 ENCOUNTER — APPOINTMENT (OUTPATIENT)
Facility: HOSPITAL | Age: 79
End: 2024-08-06
Payer: MEDICARE

## 2024-08-09 ENCOUNTER — TELEPHONE (OUTPATIENT)
Facility: HOSPITAL | Age: 79
End: 2024-08-09

## 2024-08-09 ENCOUNTER — HOSPITAL ENCOUNTER (OUTPATIENT)
Facility: HOSPITAL | Age: 79
Setting detail: RECURRING SERIES
End: 2024-08-09
Payer: MEDICARE

## 2024-08-14 ENCOUNTER — APPOINTMENT (OUTPATIENT)
Facility: HOSPITAL | Age: 79
End: 2024-08-14
Payer: MEDICARE

## 2024-08-14 ENCOUNTER — TELEPHONE (OUTPATIENT)
Facility: HOSPITAL | Age: 79
End: 2024-08-14

## 2024-08-20 ENCOUNTER — HOSPITAL ENCOUNTER (OUTPATIENT)
Facility: HOSPITAL | Age: 79
Setting detail: RECURRING SERIES
Discharge: HOME | End: 2024-08-23
Payer: MEDICARE

## 2024-08-20 PROCEDURE — 95992 CANALITH REPOSITIONING PROC: CPT

## 2024-08-20 PROCEDURE — 97530 THERAPEUTIC ACTIVITIES: CPT

## 2024-08-20 NOTE — PROGRESS NOTES
PHYSICAL / OCCUPATIONAL THERAPY - DAILY TREATMENT NOTE     Patient Name: New Hinton    Date: 2024    : 1945  Insurance: Payor: MEDICARE / Plan: MEDICARE PART A AND B / Product Type: *No Product type* /      Patient  verified Yes     Visit #   Current / Total 21 23   Time   In / Out 320 355   Pain   In / Out 0 0   Subjective Functional Status/Changes: No change stil dizzy all the time    Changes to:  Allergies, Med Hx, Sx Hx?   no       TREATMENT AREA =  Dizziness and giddiness [R42]    If an interpreting service is utilized for treatment of this patient, the contents of this document represent the material reviewed with the patient via the .     OBJECTIVE      Therapeutic Procedures:  Tx Min Billable or 1:1 Min (if diff from Tx Min) Procedure, Rationale, Specifics   15  82982 Canalith Repositioning (un-timed):  correct positional vertigo, decrease dizziness, improve balance to improve patient's ability to progress to PLOF and address remaining functional goals.    Details if applicable:  [facility] for left posterior canal BPPV      10  14830 Therapeutic Activity (timed):  use of dynamic activities replicating functional movements to increase ROM, strength, coordination, balance, and proprioception in order to improve patient's ability to progress to PLOF and address remaining functional goals.  (see flow sheet as applicable)    Details if applicable:            Details if applicable:           Details if applicable:            Details if applicable:     25  [facility] Totals Reminder: bill using total billable min of TIMED therapeutic procedures (example: do not include dry needle or estim unattended, both untimed codes, in totals to left)  8-22 min = 1 unit; 23-37 min = 2 units; 38-52 min = 3 units; 53-67 min = 4 units; 68-82 min = 5 units   Total Total     TOTAL TREATMENT TIME:        25     [x]  Patient Education billed concurrently with other procedures   [x] Review HEP    [] 
Sharee, PT 8/20/2024 8:11 AM    ________________________________________________________________________  I certify that the above Therapy Services are being furnished while the patient is under my care. I agree with the treatment plan and certify that this therapy is necessary.    [] I have read the above and request that my patient continue as recommended.  [] I have read the above report and request that my patient continue therapy with the following changes/special instructions: _______________________________________  [] I have read the above report and request that my patient be discharged from therapy    Physician's Signature:____________________ Date:_________ TIME:________    ** Signature, Date and Time must be completed for valid certification **    Please sign and return to:     [facility] [place] [address]  [place]  [address]  Phone: [phone]      Fax:  [phone]

## 2024-08-22 ENCOUNTER — HOSPITAL ENCOUNTER (OUTPATIENT)
Facility: HOSPITAL | Age: 79
Setting detail: RECURRING SERIES
Discharge: HOME | End: 2024-08-25
Payer: MEDICARE

## 2024-08-22 PROCEDURE — 97110 THERAPEUTIC EXERCISES: CPT

## 2024-08-22 PROCEDURE — 97535 SELF CARE MNGMENT TRAINING: CPT

## 2024-08-22 PROCEDURE — 97530 THERAPEUTIC ACTIVITIES: CPT

## 2024-08-22 NOTE — PROGRESS NOTES
PHYSICAL / OCCUPATIONAL THERAPY - DAILY TREATMENT NOTE     Patient Name: New Hinton    Date: 2024    : 1945  Insurance: Payor: MEDICARE / Plan: MEDICARE PART A AND B / Product Type: *No Product type* /      Patient  verified Yes     Visit #   Current / Total 22 29   Time   In / Out 355 433   Pain   In / Out 0 0   Subjective Functional Status/Changes: I was really woozy after last time but better today   Changes to:  Allergies, Med Hx, Sx Hx?   no       TREATMENT AREA =  Dizziness and giddiness [R42]    If an interpreting service is utilized for treatment of this patient, the contents of this document represent the material reviewed with the patient via the .     OBJECTIVE        Therapeutic Procedures:  Tx Min Billable or 1:1 Min (if diff from Tx Min) Procedure, Rationale, Specifics   18  61194 Therapeutic Activity (timed):  use of dynamic activities replicating functional movements to increase ROM, strength, coordination, balance, and proprioception in order to improve patient's ability to progress to PLOF and address remaining functional goals.  (see flow sheet as applicable)    Details if applicable:       12  93130 Self Care/Home Management (timed):  improve patient knowledge and understanding of positioning, posture/ergonomics, diagnosis/prognosis, and physical therapy expectations, procedures and progression  to improve patient's ability to progress to PLOF and address remaining functional goals.  (see flow sheet as applicable)    Details if applicable:     8  85113 Therapeutic Exercise (timed):  increase ROM, strength, coordination, balance, and proprioception to improve patient's ability to progress to PLOF and address remaining functional goals. (see flow sheet as applicable)     Details if applicable:           Details if applicable:            Details if applicable:     38  [facility] Totals Reminder: bill using total billable min of TIMED therapeutic procedures (example: do not

## 2024-08-26 ENCOUNTER — APPOINTMENT (OUTPATIENT)
Facility: HOSPITAL | Age: 79
End: 2024-08-26
Payer: MEDICARE

## 2024-08-29 ENCOUNTER — APPOINTMENT (OUTPATIENT)
Facility: HOSPITAL | Age: 79
End: 2024-08-29
Payer: MEDICARE
